# Patient Record
Sex: MALE | Race: WHITE | NOT HISPANIC OR LATINO | Employment: FULL TIME | URBAN - METROPOLITAN AREA
[De-identification: names, ages, dates, MRNs, and addresses within clinical notes are randomized per-mention and may not be internally consistent; named-entity substitution may affect disease eponyms.]

---

## 2017-01-24 ENCOUNTER — GENERIC CONVERSION - ENCOUNTER (OUTPATIENT)
Dept: OTHER | Facility: OTHER | Age: 32
End: 2017-01-24

## 2017-02-07 ENCOUNTER — GENERIC CONVERSION - ENCOUNTER (OUTPATIENT)
Dept: OTHER | Facility: OTHER | Age: 32
End: 2017-02-07

## 2017-03-31 ENCOUNTER — GENERIC CONVERSION - ENCOUNTER (OUTPATIENT)
Dept: OTHER | Facility: OTHER | Age: 32
End: 2017-03-31

## 2017-04-26 ENCOUNTER — GENERIC CONVERSION - ENCOUNTER (OUTPATIENT)
Dept: OTHER | Facility: OTHER | Age: 32
End: 2017-04-26

## 2017-06-02 ENCOUNTER — GENERIC CONVERSION - ENCOUNTER (OUTPATIENT)
Dept: OTHER | Facility: OTHER | Age: 32
End: 2017-06-02

## 2017-06-16 ENCOUNTER — ALLSCRIPTS OFFICE VISIT (OUTPATIENT)
Dept: OTHER | Facility: OTHER | Age: 32
End: 2017-06-16

## 2017-06-16 DIAGNOSIS — R73.02 IMPAIRED GLUCOSE TOLERANCE: ICD-10-CM

## 2017-06-16 DIAGNOSIS — Z13.1 ENCOUNTER FOR SCREENING FOR DIABETES MELLITUS: ICD-10-CM

## 2017-06-16 DIAGNOSIS — R25.1 TREMOR: ICD-10-CM

## 2017-06-16 DIAGNOSIS — F41.1 GENERALIZED ANXIETY DISORDER: ICD-10-CM

## 2017-07-21 ENCOUNTER — GENERIC CONVERSION - ENCOUNTER (OUTPATIENT)
Dept: OTHER | Facility: OTHER | Age: 32
End: 2017-07-21

## 2017-09-19 ENCOUNTER — GENERIC CONVERSION - ENCOUNTER (OUTPATIENT)
Dept: OTHER | Facility: OTHER | Age: 32
End: 2017-09-19

## 2017-10-03 ENCOUNTER — GENERIC CONVERSION - ENCOUNTER (OUTPATIENT)
Dept: OTHER | Facility: OTHER | Age: 32
End: 2017-10-03

## 2017-10-25 ENCOUNTER — GENERIC CONVERSION - ENCOUNTER (OUTPATIENT)
Dept: OTHER | Facility: OTHER | Age: 32
End: 2017-10-25

## 2018-01-14 VITALS
SYSTOLIC BLOOD PRESSURE: 120 MMHG | WEIGHT: 185 LBS | HEIGHT: 69 IN | DIASTOLIC BLOOD PRESSURE: 84 MMHG | BODY MASS INDEX: 27.4 KG/M2

## 2019-02-22 ENCOUNTER — OFFICE VISIT (OUTPATIENT)
Dept: FAMILY MEDICINE CLINIC | Facility: CLINIC | Age: 34
End: 2019-02-22
Payer: COMMERCIAL

## 2019-02-22 VITALS
BODY MASS INDEX: 29.77 KG/M2 | DIASTOLIC BLOOD PRESSURE: 80 MMHG | TEMPERATURE: 98.1 F | HEIGHT: 69 IN | SYSTOLIC BLOOD PRESSURE: 106 MMHG | WEIGHT: 201 LBS

## 2019-02-22 DIAGNOSIS — J32.9 SINUSITIS, UNSPECIFIED CHRONICITY, UNSPECIFIED LOCATION: Primary | ICD-10-CM

## 2019-02-22 DIAGNOSIS — J40 BRONCHITIS: ICD-10-CM

## 2019-02-22 PROBLEM — F41.1 GENERALIZED ANXIETY DISORDER: Status: ACTIVE | Noted: 2017-06-16

## 2019-02-22 PROCEDURE — 3008F BODY MASS INDEX DOCD: CPT | Performed by: PHYSICIAN ASSISTANT

## 2019-02-22 PROCEDURE — 99214 OFFICE O/P EST MOD 30 MIN: CPT | Performed by: PHYSICIAN ASSISTANT

## 2019-02-22 RX ORDER — LORAZEPAM 1 MG/1
TABLET ORAL
COMMUNITY
Start: 2017-06-16 | End: 2019-02-22 | Stop reason: ALTCHOICE

## 2019-02-22 RX ORDER — SERTRALINE HYDROCHLORIDE 25 MG/1
1 TABLET, FILM COATED ORAL DAILY
COMMUNITY
Start: 2017-06-16 | End: 2019-02-22 | Stop reason: ALTCHOICE

## 2019-02-22 RX ORDER — PRIMIDONE 50 MG/1
50 TABLET ORAL DAILY
COMMUNITY

## 2019-02-22 RX ORDER — AZITHROMYCIN 250 MG/1
TABLET, FILM COATED ORAL
Qty: 6 TABLET | Refills: 0 | Status: SHIPPED | OUTPATIENT
Start: 2019-02-22 | End: 2019-02-26

## 2019-02-22 RX ORDER — PROPRANOLOL HYDROCHLORIDE 80 MG/1
120 CAPSULE, EXTENDED RELEASE ORAL DAILY
COMMUNITY
End: 2021-03-01 | Stop reason: DRUGHIGH

## 2019-02-22 NOTE — LETTER
February 22, 2019     Patient: Sivan Gonzalez   YOB: 1985   Date of Visit: 2/22/2019       To Whom it May Concern:    Sivan Gonzalez is under my professional care  He was seen in my office on 2/22/2019  He may return to work on 2/26/19  If you have any questions or concerns, please don't hesitate to call           Sincerely,          Aicha Douglas PA-C        CC: No Recipients

## 2019-02-22 NOTE — PROGRESS NOTES
Assessment/Plan:         Diagnoses and all orders for this visit:    Sinusitis, unspecified chronicity, unspecified location  -     azithromycin (ZITHROMAX) 250 mg tablet; Day 1 take 2 tablets, days 2-5 take 1 tablet  Bronchitis  -     azithromycin (ZITHROMAX) 250 mg tablet; Day 1 take 2 tablets, days 2-5 take 1 tablet  Other orders  -     propranolol (INDERAL LA) 80 mg 24 hr capsule; Take 1 capsule by mouth 3 (three) times a day  -     Discontinue: LORazepam (ATIVAN) 1 mg tablet; Take by mouth  -     Discontinue: sertraline (ZOLOFT) 25 mg tablet; Take 1 tablet by mouth daily  -     primidone (MYSOLINE) 50 mg tablet; Take 50 mg by mouth daily          Subjective:      Patient ID: Fabian Walters is a 28 y o  male  [place] [facility] [place] here today complaining of cold symptoms x few days  URI    This is a new problem  The current episode started in the past 7 days  The problem has been unchanged  There has been no fever  Associated symptoms include abdominal pain, congestion, coughing, headaches, rhinorrhea, sinus pain, sneezing and a sore throat  Pertinent negatives include no chest pain, diarrhea, dysuria, ear pain, nausea, neck pain, rash, swollen glands, vomiting or wheezing  He has tried nothing for the symptoms  The following portions of the patient's history were reviewed and updated as appropriate:   He  has no past medical history on file  He   Patient Active Problem List    Diagnosis Date Noted    Generalized anxiety disorder 06/16/2017    Tremor 07/19/2013     He  has a past surgical history that includes Brain surgery and Knee surgery  His family history includes No Known Problems in his mother  He  reports that he has been smoking  He has never used smokeless tobacco  He reports that he drinks alcohol  His drug history is not on file    Current Outpatient Medications   Medication Sig Dispense Refill    primidone (MYSOLINE) 50 mg tablet Take 50 mg by mouth daily      propranolol (INDERAL LA) 80 mg 24 hr capsule Take 1 capsule by mouth 3 (three) times a day      azithromycin (ZITHROMAX) 250 mg tablet Day 1 take 2 tablets, days 2-5 take 1 tablet  6 tablet 0     No current facility-administered medications for this visit  Current Outpatient Medications on File Prior to Visit   Medication Sig    primidone (MYSOLINE) 50 mg tablet Take 50 mg by mouth daily    propranolol (INDERAL LA) 80 mg 24 hr capsule Take 1 capsule by mouth 3 (three) times a day    [DISCONTINUED] LORazepam (ATIVAN) 1 mg tablet Take by mouth    [DISCONTINUED] sertraline (ZOLOFT) 25 mg tablet Take 1 tablet by mouth daily     No current facility-administered medications on file prior to visit  He has No Known Allergies       Review of Systems   Constitutional: Positive for chills  Negative for activity change, appetite change, diaphoresis, fatigue, fever and unexpected weight change  HENT: Positive for congestion, postnasal drip, rhinorrhea, sinus pressure, sinus pain, sneezing and sore throat  Negative for ear pain, tinnitus and voice change  Eyes: Negative for pain, redness and visual disturbance  Respiratory: Positive for cough  Negative for chest tightness, shortness of breath and wheezing  Cardiovascular: Negative for chest pain, palpitations and leg swelling  Gastrointestinal: Positive for abdominal pain  Negative for blood in stool, constipation, diarrhea, nausea and vomiting  Genitourinary: Negative for difficulty urinating, dysuria, frequency, hematuria and urgency  Musculoskeletal: Negative for arthralgias, back pain, gait problem, joint swelling, myalgias, neck pain and neck stiffness  Skin: Negative for color change, pallor, rash and wound  Neurological: Positive for headaches  Negative for dizziness, tremors, weakness and light-headedness  Psychiatric/Behavioral: Negative for dysphoric mood, self-injury, sleep disturbance and suicidal ideas  The patient is not nervous/anxious  Objective:      /80 (BP Location: Left arm, Patient Position: Sitting)   Temp 98 1 °F (36 7 °C)   Ht 5' 9" (1 753 m)   Wt 91 2 kg (201 lb)   BMI 29 68 kg/m²          Physical Exam   Constitutional: He is oriented to person, place, and time  He appears well-developed and well-nourished  No distress  HENT:   Head: Normocephalic and atraumatic  Right Ear: Hearing, external ear and ear canal normal  Tympanic membrane is injected  Left Ear: Hearing, external ear and ear canal normal  Tympanic membrane is injected  Nose: Mucosal edema and rhinorrhea present  Mouth/Throat: Uvula is midline and mucous membranes are normal  Posterior oropharyngeal erythema present  No oropharyngeal exudate, posterior oropharyngeal edema or tonsillar abscesses  Eyes: Conjunctivae are normal  Right eye exhibits no discharge  Left eye exhibits no discharge  No scleral icterus  Neck: Neck supple  Carotid bruit is not present  No thyromegaly present  Cardiovascular: Normal rate, regular rhythm and normal heart sounds  No murmur heard  Pulmonary/Chest: Effort normal and breath sounds normal  No respiratory distress  He has no wheezes  Abdominal: Soft  Bowel sounds are normal  He exhibits no distension and no mass  There is no tenderness  There is no rebound and no guarding  Musculoskeletal: Normal range of motion  He exhibits no edema or tenderness  Lymphadenopathy:     He has cervical adenopathy  Neurological: He is alert and oriented to person, place, and time  Skin: Skin is warm and dry  No rash noted  He is not diaphoretic  No erythema  Psychiatric: He has a normal mood and affect  His behavior is normal  Judgment and thought content normal    Vitals reviewed

## 2019-09-13 ENCOUNTER — OFFICE VISIT (OUTPATIENT)
Dept: FAMILY MEDICINE CLINIC | Facility: CLINIC | Age: 34
End: 2019-09-13
Payer: COMMERCIAL

## 2019-09-13 VITALS
WEIGHT: 191.4 LBS | OXYGEN SATURATION: 97 % | HEIGHT: 69 IN | TEMPERATURE: 98.1 F | BODY MASS INDEX: 28.35 KG/M2 | DIASTOLIC BLOOD PRESSURE: 92 MMHG | HEART RATE: 70 BPM | SYSTOLIC BLOOD PRESSURE: 128 MMHG

## 2019-09-13 DIAGNOSIS — M10.471 ACUTE GOUT DUE TO OTHER SECONDARY CAUSE INVOLVING TOE OF RIGHT FOOT: Primary | ICD-10-CM

## 2019-09-13 DIAGNOSIS — Z71.6 TOBACCO ABUSE COUNSELING: ICD-10-CM

## 2019-09-13 DIAGNOSIS — M10.9 PODAGRA: ICD-10-CM

## 2019-09-13 DIAGNOSIS — Z13.31 DEPRESSION SCREENING NEGATIVE: ICD-10-CM

## 2019-09-13 PROCEDURE — 3008F BODY MASS INDEX DOCD: CPT | Performed by: PHYSICIAN ASSISTANT

## 2019-09-13 PROCEDURE — 99214 OFFICE O/P EST MOD 30 MIN: CPT | Performed by: PHYSICIAN ASSISTANT

## 2019-09-13 RX ORDER — COLCHICINE 0.6 MG/1
0.6 TABLET ORAL 2 TIMES DAILY
Qty: 14 TABLET | Refills: 0 | Status: SHIPPED | OUTPATIENT
Start: 2019-09-13 | End: 2019-11-08 | Stop reason: ALTCHOICE

## 2019-09-13 NOTE — PATIENT INSTRUCTIONS
Gout   AMBULATORY CARE:   Gout  is a form of arthritis that causes severe joint pain and stiffness  Acute gout pain starts suddenly, gets worse quickly, and stops on its own  Acute gout can become chronic and cause permanent damage to your joints  Seek care immediately if:   · You have severe pain in one or more of your joints that you cannot tolerate  · You have a fever or redness that spreads beyond the joint area  Contact your healthcare provider if:   · You have new symptoms, such as a rash, after you start gout treatment  · Your joint pain and swelling do not go away, even after treatment  · You are not urinating as much or as often as you usually do  · You have trouble taking your gout medicines  · You have questions or concerns about your condition or care  Stages of gout:   · Hyperuricemia  starts with high levels of uric acid  Hyperuricemia is not gout, but it increases your risk for gout  You may have no symptoms at this stage, and it usually does not need treatment  · Acute gouty arthritis  starts with a sudden attack of pain and swelling, usually in 1 joint  The attack may last from a few days to 2 weeks  · Intercritical gout  is the time between attacks  You may go months or years without another attack  You will not have joint pain or stiffness, but this does not mean your gout is cured  You will still need treatment to prevent chronic gout  · Chronic tophaceous gout  develops if gout is not treated  Large amounts of uric acid crystals, called tophi, collect around your joints  The crystals can destroy or deform the joints  Gout attacks occur more often, and last hours to weeks  More than 1 joint may be painful and swollen  At this stage, gout symptoms do not go away on their own  Treatment  can make your symptoms stop sooner, prevent attacks, and decrease your risk of joint damage   You may need any of the following:  · Medicines:      ¨ NSAIDs , such as ibuprofen, help decrease swelling, pain, and fever  This medicine is available with or without a doctor's order  NSAIDs can cause stomach bleeding or kidney problems in certain people  If you take blood thinner medicine, always ask your healthcare provider if NSAIDs are safe for you  Always read the medicine label and follow directions  ¨ Gout medicine  decreases joint pain and swelling  It may also be given to prevent new gout attacks  ¨ Steroids  reduce inflammation and can help your joint stiffness and pain during gout attacks  ¨ Uric acid medicine  may be given to reduce the amount of uric acid your body makes  Some medicines may help you pass more uric acid when you urinate  ¨ Take your medicine as directed  Contact your healthcare provider if you think your medicine is not helping or if you have side effects  Tell him or her if you are allergic to any medicine  Keep a list of the medicines, vitamins, and herbs you take  Include the amounts, and when and why you take them  Bring the list or the pill bottles to follow-up visits  Carry your medicine list with you in case of an emergency  · Surgery  called a bone graft may be needed for tophi that are painful or infected  Bone in the joint may be replaced with bone taken from another place in your body  Ask your healthcare provider for more information about bone graft surgery  Manage gout:   · Rest your painful joint so it can heal   Your healthcare provider may recommend crutches or a walker if the affected joint is in a leg  · Apply ice to your joint  Ice decreases pain and swelling  Use an ice pack, or put crushed ice in a plastic bag  Cover the ice pack or bag with a towel before you apply it to your painful joint  Apply ice for 15 to 20 minutes every hour, or as directed  · Elevate your joint  Elevation helps reduce swelling and pain  Raise your joint above the level of your heart as often as you can   Prop your painful joint on pillows to keep it above your heart comfortably  · Go to physical therapy if directed  A physical therapist can teach you exercises to improve flexibility and range of motion  Prevent gout attacks:   · Do not eat high-purine foods  These foods include meats, seafood, asparagus, spinach, cauliflower, and some types of beans  Healthcare providers may tell you to eat more low-fat milk products, such as yogurt  Milk products may decrease your risk for gout attacks  Vitamin C and coffee may also help  Your healthcare provider or dietitian can help you create a meal plan  · Drink more liquids as directed  Liquids such as water help remove uric acid from your body  Ask how much liquid to drink each day and which liquids are best for you  · Manage your weight  Weight loss may decrease the amount of uric acid in your body  Exercise can help you lose weight  Talk to your healthcare provider about the best exercises for you  · Control your blood sugar level if you have diabetes  Keep your blood sugar level in a normal range  This can help prevent gout attacks  · Limit or do not drink alcohol as directed  Alcohol can trigger a gout attack  Alcohol also increases your risk for dehydration  Ask your healthcare provider if alcohol is safe for you  Follow up with your healthcare provider as directed:  Write down your questions so you remember to ask them during your visits  © 2017 [facility] Information is for End User's use only and may not be sold, redistributed or otherwise used for commercial purposes  All illustrations and images included in CareNotes® are the copyrighted property of [employer] D [employer] Inc  or Jonel Collins  The above information is an  only  It is not intended as medical advice for individual conditions or treatments  Talk to your doctor, nurse or pharmacist before following any medical regimen to see if it is safe and effective for you

## 2019-09-13 NOTE — PROGRESS NOTES
Assessment/Plan:    Problem List Items Addressed This Visit     None      Visit Diagnoses     Acute gout due to other secondary cause involving toe of right foot    -  Primary    Relevant Medications    colchicine (COLCRYS) 0 6 mg tablet    Depression screening negative        Tobacco abuse counseling        BMI 28 0-28 9,adult        Podagra               Diagnoses and all orders for this visit:    Acute gout due to other secondary cause involving toe of right foot  -     colchicine (COLCRYS) 0 6 mg tablet; Take 1 tablet (0 6 mg total) by mouth 2 (two) times a day    Depression screening negative    Tobacco abuse counseling    BMI 28 0-28 9,adult    Podagra        Patient has been taking ibuprofen 600 mg TID, recommended increasing to 800 mg TID  Will add colchicine  Can use ice as it's been providing him pain relief  Avoid alcohol/seafood for awhile, do not eat it in large quantities in the future as these are triggers for him  Subjective:      Patient ID: Jolynn Jo is a 27 y o  male  Pancho Morrell is here today complaining of sudden onset R great toe pain 2-3 days ago  Recently was on vacation at the beach and consumed increased amounts of both dark beer as well as seafood  Denies any injury to R foot  Denies fever/chills  The following portions of the patient's history were reviewed and updated as appropriate:   He has a past medical history of Herpes zoster and Tinea corporis  ,  does not have any pertinent problems on file  ,   has a past surgical history that includes Brain surgery; Knee surgery (Left); Insertion / placement / revision neurostimulator (01/2016); and Tooth extraction  ,  family history includes Diabetes in his paternal grandmother; No Known Problems in his mother; Pancreatic cancer in his maternal grandmother  ,   reports that he has been smoking cigarettes  He has never used smokeless tobacco  He reports that he drinks alcohol  His drug history is not on file  ,  has No Known Allergies     Current Outpatient Medications   Medication Sig Dispense Refill    primidone (MYSOLINE) 50 mg tablet Take 50 mg by mouth daily      propranolol (INDERAL LA) 80 mg 24 hr capsule Take 1 capsule by mouth 3 (three) times a day      colchicine (COLCRYS) 0 6 mg tablet Take 1 tablet (0 6 mg total) by mouth 2 (two) times a day 14 tablet 0     No current facility-administered medications for this visit  Review of Systems   Constitutional: Negative for activity change, appetite change, chills, diaphoresis, fatigue, fever and unexpected weight change  HENT: Negative for congestion, ear pain, postnasal drip, rhinorrhea, sinus pressure, sinus pain, sneezing, sore throat, tinnitus and voice change  Eyes: Negative for pain, redness and visual disturbance  Respiratory: Negative for cough, chest tightness, shortness of breath and wheezing  Cardiovascular: Negative for chest pain, palpitations and leg swelling  Gastrointestinal: Negative for abdominal pain, blood in stool, constipation, diarrhea, nausea and vomiting  Genitourinary: Negative for difficulty urinating, dysuria, frequency, hematuria and urgency  Musculoskeletal: Positive for arthralgias and gait problem  Negative for back pain, joint swelling, myalgias, neck pain and neck stiffness  Skin: Negative for color change, pallor, rash and wound  Neurological: Negative for dizziness, tremors, weakness, light-headedness and headaches  Psychiatric/Behavioral: Negative for dysphoric mood, self-injury, sleep disturbance and suicidal ideas  The patient is not nervous/anxious  Objective:  Vitals:    09/13/19 1103   BP: 128/92   Pulse: 70   Temp: 98 1 °F (36 7 °C)   SpO2: 97%   Weight: 86 8 kg (191 lb 6 4 oz)   Height: 5' 9" (1 753 m)     Body mass index is 28 26 kg/m²  Physical Exam   Constitutional: He is oriented to person, place, and time  He appears well-developed and well-nourished  No distress     HENT:   Head: Normocephalic and atraumatic  Right Ear: Hearing, tympanic membrane, external ear and ear canal normal    Left Ear: Hearing, tympanic membrane, external ear and ear canal normal    Mouth/Throat: Uvula is midline, oropharynx is clear and moist and mucous membranes are normal  No oropharyngeal exudate  Eyes: Conjunctivae are normal  Right eye exhibits no discharge  Left eye exhibits no discharge  No scleral icterus  Neck: Neck supple  Carotid bruit is not present  No thyromegaly present  Cardiovascular: Normal rate, regular rhythm and normal heart sounds  No murmur heard  Pulmonary/Chest: Effort normal and breath sounds normal  No respiratory distress  He has no wheezes  Abdominal: Soft  Bowel sounds are normal  He exhibits no distension and no mass  There is no tenderness  There is no rebound and no guarding  Musculoskeletal: Normal range of motion  He exhibits no edema or tenderness  Feet:    Lymphadenopathy:     He has no cervical adenopathy  Neurological: He is alert and oriented to person, place, and time  Skin: Skin is warm and dry  No rash noted  He is not diaphoretic  No erythema  Psychiatric: He has a normal mood and affect  His behavior is normal  Judgment and thought content normal    Vitals reviewed  PHQ-9 Depression Screening    PHQ-9:    Frequency of the following problems over the past two weeks:       Little interest or pleasure in doing things:  0 - not at all  Feeling down, depressed, or hopeless:  0 - not at all  PHQ-2 Score:  0         Tobacco Cessation Counseling: Tobacco cessation counseling and education was provided  The patient is sincerely urged to quit consumption of tobacco  He is not ready to quit tobacco  The numerous health risks of tobacco consumption were discussed  If he decides to quit, there are a number of helpful adjunctive aids, and he can see me to discuss nicotine replacement therapy, chantix, or bupropion anytime in the future         BMI Counseling: Body mass index is 28 26 kg/m²  The BMI is above normal  Nutrition recommendations include consuming healthier snacks  Exercise recommendations include exercising 3-5 times per week

## 2019-11-08 ENCOUNTER — OFFICE VISIT (OUTPATIENT)
Dept: URGENT CARE | Facility: CLINIC | Age: 34
End: 2019-11-08
Payer: COMMERCIAL

## 2019-11-08 VITALS
HEIGHT: 69 IN | SYSTOLIC BLOOD PRESSURE: 124 MMHG | HEART RATE: 88 BPM | BODY MASS INDEX: 28.29 KG/M2 | RESPIRATION RATE: 20 BRPM | OXYGEN SATURATION: 97 % | DIASTOLIC BLOOD PRESSURE: 79 MMHG | WEIGHT: 191 LBS | TEMPERATURE: 98.8 F

## 2019-11-08 DIAGNOSIS — H66.91 RIGHT OTITIS MEDIA, UNSPECIFIED OTITIS MEDIA TYPE: ICD-10-CM

## 2019-11-08 DIAGNOSIS — J01.00 ACUTE MAXILLARY SINUSITIS, RECURRENCE NOT SPECIFIED: Primary | ICD-10-CM

## 2019-11-08 PROCEDURE — 99213 OFFICE O/P EST LOW 20 MIN: CPT | Performed by: PHYSICIAN ASSISTANT

## 2019-11-08 RX ORDER — AMOXICILLIN AND CLAVULANATE POTASSIUM 875; 125 MG/1; MG/1
1 TABLET, FILM COATED ORAL EVERY 12 HOURS SCHEDULED
Qty: 20 TABLET | Refills: 0 | Status: SHIPPED | COMMUNITY
Start: 2019-11-08 | End: 2019-11-18

## 2019-11-08 NOTE — PROGRESS NOTES
[address] [facility]  [facility] [phone]  (fax) [phone]        NAME: Merced Navas is a 27 y o  male  : 1985    MRN: [id number]  DATE: 2019  TIME: 9:41 AM    Assessment and Plan   Acute maxillary sinusitis, recurrence not specified [J01 00]  1  Acute maxillary sinusitis, recurrence not specified  amoxicillin-clavulanate (AUGMENTIN) 875-125 mg per tablet   2  Right otitis media, unspecified otitis media type         Patient Instructions   I have prescribed an antibiotic for the infection  Please take the antibiotic as prescribed and finish the entire prescription  I recommend that the patient takes an over the counter probiotic or eats yogurt with live cultures in it [place]) to keep good bacteria in the gut and help prevent diarrhea  Wash hands frequently to prevent the spread of infection  Can use over the counter cough and cold medications to help with symptoms  Ibuprofen and/or tylenol as needed for pain or fever  If not improving over the next 3-5 days, follow up with PCP  To present to the ER if symptoms worsen  Chief Complaint     Chief Complaint   Patient presents with    Cold Like Symptoms     x 1 week    Cough    Headache         History of Present Illness   Merced Navas presents to the clinic c/o    Sinusitis   This is a new problem  The current episode started 1 to 4 weeks ago  The problem has been gradually worsening since onset  There has been no fever  The pain is moderate  Associated symptoms include congestion, coughing, ear pain, headaches, sinus pressure and a sore throat  Pertinent negatives include no chills, diaphoresis, hoarse voice, neck pain, shortness of breath, sneezing or swollen glands  Past treatments include oral decongestants  The treatment provided mild relief         Review of Systems   Review of Systems   Constitutional: Negative for activity change, appetite change, chills, diaphoresis, fatigue and fever  HENT: Positive for congestion, ear pain, postnasal drip, sinus pressure, sinus pain and sore throat  Negative for ear discharge, facial swelling, hoarse voice, rhinorrhea and sneezing  Eyes: Negative for photophobia, pain, discharge, redness, itching and visual disturbance  Respiratory: Positive for cough  Negative for apnea, choking, chest tightness, shortness of breath, wheezing and stridor  Cardiovascular: Negative for chest pain, palpitations and leg swelling  Gastrointestinal: Negative for abdominal distention, abdominal pain, constipation, diarrhea, nausea and vomiting  Genitourinary: Negative for dysuria, flank pain, frequency, hematuria and urgency  Musculoskeletal: Negative for arthralgias, back pain, gait problem, joint swelling, myalgias, neck pain and neck stiffness  Skin: Negative for color change, rash and wound  Allergic/Immunologic: Negative for immunocompromised state  Neurological: Positive for headaches  Negative for dizziness  Hematological: Negative for adenopathy  Psychiatric/Behavioral: Negative for confusion           Current Medications     Long-Term Medications   Medication Sig Dispense Refill    primidone (MYSOLINE) 50 mg tablet Take 50 mg by mouth daily      propranolol (INDERAL LA) 80 mg 24 hr capsule Take 1 capsule by mouth 3 (three) times a day         Current Allergies     Allergies as of 11/08/2019    (No Known Allergies)            The following portions of the patient's history were reviewed and updated as appropriate: allergies, current medications, past family history, past medical history, past social history, past surgical history and problem list   Past Medical History:   Diagnosis Date    Herpes zoster     Tinea corporis      Past Surgical History:   Procedure Laterality Date    BRAIN SURGERY      INSERTION / Hilario Lajas / Sallie Correaet NEUROSTIMULATOR  01/2016    with LED replacement 1/2017    KNEE SURGERY Left     TOOTH EXTRACTION       Social History     Socioeconomic History    Marital status:      Spouse name: Not on file    Number of children: Not on file    Years of education: Not on file    Highest education level: Not on file   Occupational History    Not on file   Social Needs    Financial resource strain: Not on file    Food insecurity:     Worry: Not on file     Inability: Not on file    Transportation needs:     Medical: Not on file     Non-medical: Not on file   Tobacco Use    Smoking status: Current Every Day Smoker     Types: Cigarettes    Smokeless tobacco: Never Used   Substance and Sexual Activity    Alcohol use: Yes     Frequency: 2-3 times a week     Comment: socially    Drug use: Never    Sexual activity: Not on file   Lifestyle    Physical activity:     Days per week: Not on file     Minutes per session: Not on file    Stress: Not on file   Relationships    Social connections:     Talks on phone: Not on file     Gets together: Not on file     Attends Zoroastrianism service: Not on file     Active member of club or organization: Not on file     Attends meetings of clubs or organizations: Not on file     Relationship status: Not on file    Intimate partner violence:     Fear of current or ex partner: Not on file     Emotionally abused: Not on file     Physically abused: Not on file     Forced sexual activity: Not on file   Other Topics Concern    Not on file   Social History Narrative    No living will       Objective   /79 (BP Location: Right arm, Patient Position: Sitting, Cuff Size: Standard)   Pulse 88   Temp 98 8 °F (37 1 °C) (Tympanic)   Resp 20   Ht 5' 9" (1 753 m)   Wt 86 6 kg (191 lb)   SpO2 97%   BMI 28 21 kg/m²      Physical Exam     Physical Exam   Constitutional: He is oriented to person, place, and time  He appears well-developed and well-nourished  No distress  HENT:   Head: Normocephalic and atraumatic     Right Ear: External ear normal  Tympanic membrane is erythematous and bulging  Left Ear: Tympanic membrane and external ear normal    Nose: Mucosal edema present  Right sinus exhibits maxillary sinus tenderness  Right sinus exhibits no frontal sinus tenderness  Left sinus exhibits maxillary sinus tenderness  Left sinus exhibits no frontal sinus tenderness  Mouth/Throat: Posterior oropharyngeal erythema present  No oropharyngeal exudate  Eyes: Pupils are equal, round, and reactive to light  Conjunctivae and EOM are normal  Right eye exhibits no discharge  Left eye exhibits no discharge  No scleral icterus  Neck: Normal range of motion  Neck supple  No JVD present  No tracheal deviation present  No thyromegaly present  Cardiovascular: Normal rate, regular rhythm and normal heart sounds  Exam reveals no gallop and no friction rub  No murmur heard  Pulmonary/Chest: Effort normal and breath sounds normal  No stridor  No respiratory distress  He has no decreased breath sounds  He has no wheezes  He has no rhonchi  He has no rales  He exhibits no tenderness  Musculoskeletal: Normal range of motion  He exhibits no tenderness or deformity  Lymphadenopathy:     He has no cervical adenopathy  Neurological: He is alert and oriented to person, place, and time  Coordination normal    Skin: Skin is warm and dry  No rash noted  He is not diaphoretic  No erythema  No pallor  Psychiatric: He has a normal mood and affect  His behavior is normal  Judgment and thought content normal    Nursing note and vitals reviewed        Markus Champion PA-C

## 2020-04-01 ENCOUNTER — TELEPHONE (OUTPATIENT)
Dept: FAMILY MEDICINE CLINIC | Facility: CLINIC | Age: 35
End: 2020-04-01

## 2020-04-01 DIAGNOSIS — J01.01 ACUTE RECURRENT MAXILLARY SINUSITIS: Primary | ICD-10-CM

## 2020-04-01 RX ORDER — AMOXICILLIN 500 MG/1
1000 CAPSULE ORAL EVERY 12 HOURS SCHEDULED
Qty: 40 CAPSULE | Refills: 0 | Status: SHIPPED | OUTPATIENT
Start: 2020-04-01 | End: 2020-04-11

## 2020-04-03 ENCOUNTER — TELEMEDICINE (OUTPATIENT)
Dept: FAMILY MEDICINE CLINIC | Facility: CLINIC | Age: 35
End: 2020-04-03
Payer: COMMERCIAL

## 2020-04-03 VITALS — BODY MASS INDEX: 30.36 KG/M2 | WEIGHT: 205 LBS | HEIGHT: 69 IN

## 2020-04-03 DIAGNOSIS — J01.01 ACUTE RECURRENT MAXILLARY SINUSITIS: Primary | ICD-10-CM

## 2020-04-03 PROCEDURE — 99213 OFFICE O/P EST LOW 20 MIN: CPT | Performed by: FAMILY MEDICINE

## 2020-04-03 PROCEDURE — 3008F BODY MASS INDEX DOCD: CPT | Performed by: FAMILY MEDICINE

## 2020-04-03 RX ORDER — AMOXICILLIN AND CLAVULANATE POTASSIUM 875; 125 MG/1; MG/1
1 TABLET, FILM COATED ORAL EVERY 12 HOURS SCHEDULED
Qty: 20 TABLET | Refills: 0 | Status: SHIPPED | OUTPATIENT
Start: 2020-04-03 | End: 2020-04-13

## 2020-09-15 ENCOUNTER — TELEMEDICINE (OUTPATIENT)
Dept: FAMILY MEDICINE CLINIC | Facility: CLINIC | Age: 35
End: 2020-09-15
Payer: COMMERCIAL

## 2020-09-15 VITALS — HEIGHT: 69 IN | WEIGHT: 200 LBS | BODY MASS INDEX: 29.62 KG/M2

## 2020-09-15 DIAGNOSIS — Z20.828 EXPOSURE TO SARS-ASSOCIATED CORONAVIRUS: Primary | ICD-10-CM

## 2020-09-15 PROCEDURE — 3725F SCREEN DEPRESSION PERFORMED: CPT | Performed by: PHYSICIAN ASSISTANT

## 2020-09-15 PROCEDURE — 99213 OFFICE O/P EST LOW 20 MIN: CPT | Performed by: PHYSICIAN ASSISTANT

## 2020-09-15 PROCEDURE — U0003 INFECTIOUS AGENT DETECTION BY NUCLEIC ACID (DNA OR RNA); SEVERE ACUTE RESPIRATORY SYNDROME CORONAVIRUS 2 (SARS-COV-2) (CORONAVIRUS DISEASE [COVID-19]), AMPLIFIED PROBE TECHNIQUE, MAKING USE OF HIGH THROUGHPUT TECHNOLOGIES AS DESCRIBED BY CMS-2020-01-R: HCPCS | Performed by: PHYSICIAN ASSISTANT

## 2020-09-15 PROCEDURE — 4004F PT TOBACCO SCREEN RCVD TLK: CPT | Performed by: PHYSICIAN ASSISTANT

## 2020-09-15 NOTE — PROGRESS NOTES
COVID-19 Virtual Visit     Assessment/Plan:    Problem List Items Addressed This Visit     None      Visit Diagnoses     Exposure to SARS-associated coronavirus    -  Primary    Relevant Orders    Novel Coronavirus (COVID-19), PCR [facility] - Collected in Office        This virtual check-in was done via [employer] and patient was informed that this is not a secure, HIPAA-complaint platform  He agrees to proceed     Disposition:      I recommended Porter come to our office for a nasal swab for COVID-19    I spent 5 minutes directly with the patient during this visit    Encounter provider Marion Elizabeth PA-C    Provider located at [address] [address]    Recent Visits  No visits were found meeting these conditions  Showing recent visits within past 7 days and meeting all other requirements     Today's Visits  Date Type Provider Dept   09/15/20 Telemedicine Marion Elizabeth PA-C  Katie Fenton   Showing today's visits and meeting all other requirements     Future Appointments  No visits were found meeting these conditions  Showing future appointments within next 150 days and meeting all other requirements        Patient agrees to participate in a virtual check in via telephone or video visit instead of presenting to the office to address urgent/immediate medical needs  Patient is aware this is a billable service  After connecting through [employer] Monitoring, the patient was identified by name and date of birth  Arvind Kauffman was informed that this was a telemedicine visit and that the exam was being conducted confidentially over secure lines  My office door was closed  No one else was in the room  [place] acknowledged consent and understanding of privacy and security of the telemedicine visit  I informed the patient that I have reviewed his record in Epic and presented the opportunity for him to ask any questions regarding the visit today   The patient agreed to participate  Subjective  Walt Walker is a 38 y o  male who is concerned about COVID-19  He reports diarrhea  He has not experienced fever, chills, cough, shortness of breath, headache, sore throat, anorexia, fatigue, myalgias, anosmia, abdominal pain, nausea and vomiting He has not had contact with a person who is under investigation for or who is positive for COVID-19 within the last 14 days  He has not been hospitalized recently for fever and/or lower respiratory symptoms  Pt had diarrhea 9/13-14/20, has since resolved  Needs Covid test before he can return to work, works at [employer]  Past Medical History:   Diagnosis Date    Herpes zoster     Tinea corporis        Past Surgical History:   Procedure Laterality Date    BRAIN SURGERY      INSERTION / PLACEMENT / REVISION NEUROSTIMULATOR  01/2016    with LED replacement 1/2017    KNEE SURGERY Left     TOOTH EXTRACTION         Current Outpatient Medications   Medication Sig Dispense Refill    primidone (MYSOLINE) 50 mg tablet Take 50 mg by mouth daily      propranolol (INDERAL LA) 80 mg 24 hr capsule Take 1 capsule by mouth daily        No current facility-administered medications for this visit  No Known Allergies    Review of Systems   Constitutional: Negative for activity change, appetite change, chills, diaphoresis, fatigue, fever and unexpected weight change  HENT: Negative for congestion, ear pain, postnasal drip, rhinorrhea, sinus pressure, sinus pain, sneezing, sore throat, tinnitus and voice change  Eyes: Negative for pain, redness and visual disturbance  Respiratory: Negative for cough, chest tightness, shortness of breath and wheezing  Cardiovascular: Negative for chest pain, palpitations and leg swelling  Gastrointestinal: Positive for diarrhea  Negative for abdominal pain, blood in stool, constipation, nausea and vomiting     Genitourinary: Negative for difficulty urinating, dysuria, frequency, hematuria and urgency  Musculoskeletal: Negative for arthralgias, back pain, gait problem, joint swelling, myalgias, neck pain and neck stiffness  Skin: Negative for color change, pallor, rash and wound  Neurological: Negative for dizziness, tremors, weakness, light-headedness and headaches  Psychiatric/Behavioral: Negative for dysphoric mood, self-injury, sleep disturbance and suicidal ideas  The patient is not nervous/anxious  Video Exam    Vitals:    09/15/20 1005   Weight: 90 7 kg (200 lb)   Height: 5' 9" (1 753 m)         Shlomo Velez appears alert, cooperative  Physical Exam  Vitals signs reviewed  Constitutional:       General: He is not in acute distress  Appearance: Normal appearance  He is not ill-appearing, toxic-appearing or diaphoretic  HENT:      Head: Normocephalic and atraumatic  Pulmonary:      Effort: Pulmonary effort is normal  No respiratory distress  Comments: Pt speaking in clear, fluent sentences without cough or apparent SOB  Neurological:      General: No focal deficit present  Mental Status: He is alert and oriented to person, place, and time  Psychiatric:         Mood and Affect: Mood normal          Behavior: Behavior normal          Thought Content: Thought content normal          Judgment: Judgment normal           VIRTUAL VISIT DISCLAIMER    Doyne Prader acknowledges that he has consented to an online visit or consultation  He understands that the online visit is based solely on information provided by him, and that, in the absence of a face-to-face physical evaluation by the physician, the diagnosis he receives is both limited and provisional in terms of accuracy and completeness  This is not intended to replace a full medical face-to-face evaluation by the physician  Doyne Prader understands and accepts these terms

## 2020-09-16 LAB — SARS-COV-2 RNA SPEC QL NAA+PROBE: NOT DETECTED

## 2021-03-01 ENCOUNTER — OFFICE VISIT (OUTPATIENT)
Dept: FAMILY MEDICINE CLINIC | Facility: CLINIC | Age: 36
End: 2021-03-01
Payer: COMMERCIAL

## 2021-03-01 VITALS
BODY MASS INDEX: 30.9 KG/M2 | SYSTOLIC BLOOD PRESSURE: 134 MMHG | WEIGHT: 208.6 LBS | HEIGHT: 69 IN | DIASTOLIC BLOOD PRESSURE: 80 MMHG | HEART RATE: 68 BPM | TEMPERATURE: 98.7 F | OXYGEN SATURATION: 97 %

## 2021-03-01 DIAGNOSIS — Z28.21 REFUSED INFLUENZA VACCINE: ICD-10-CM

## 2021-03-01 DIAGNOSIS — Z71.6 TOBACCO ABUSE COUNSELING: ICD-10-CM

## 2021-03-01 DIAGNOSIS — Z13.31 DEPRESSION SCREENING NEGATIVE: ICD-10-CM

## 2021-03-01 DIAGNOSIS — S39.012A STRAIN OF LUMBAR REGION, INITIAL ENCOUNTER: Primary | ICD-10-CM

## 2021-03-01 PROCEDURE — 3725F SCREEN DEPRESSION PERFORMED: CPT | Performed by: PHYSICIAN ASSISTANT

## 2021-03-01 PROCEDURE — 4004F PT TOBACCO SCREEN RCVD TLK: CPT | Performed by: PHYSICIAN ASSISTANT

## 2021-03-01 PROCEDURE — 99214 OFFICE O/P EST MOD 30 MIN: CPT | Performed by: PHYSICIAN ASSISTANT

## 2021-03-01 PROCEDURE — 3008F BODY MASS INDEX DOCD: CPT | Performed by: PHYSICIAN ASSISTANT

## 2021-03-01 RX ORDER — MELOXICAM 7.5 MG/1
7.5 TABLET ORAL DAILY
Qty: 14 TABLET | Refills: 0 | Status: SHIPPED | OUTPATIENT
Start: 2021-03-01

## 2021-03-01 RX ORDER — PROPRANOLOL HYDROCHLORIDE 120 MG/1
CAPSULE, EXTENDED RELEASE ORAL
COMMUNITY
Start: 2021-02-26

## 2021-03-01 RX ORDER — CYCLOBENZAPRINE HCL 5 MG
5 TABLET ORAL
Qty: 7 TABLET | Refills: 0 | Status: SHIPPED | OUTPATIENT
Start: 2021-03-01 | End: 2021-03-08

## 2021-03-01 RX ORDER — PREDNISONE 10 MG/1
TABLET ORAL
Qty: 20 TABLET | Refills: 0 | Status: SHIPPED | OUTPATIENT
Start: 2021-03-01

## 2021-03-01 NOTE — LETTER
March 1, 2021     Patient: Sandra Gonzalez   YOB: 1985   Date of Visit: 3/1/2021       To Whom it May Concern:    Sandra Gonzalez is under my professional care  He was seen in my office on 3/1/2021  He may return to work on 3/9/2021  If you have any questions or concerns, please don't hesitate to call           Sincerely,          Tristan Traore PA-C        CC: No Recipients

## 2021-03-01 NOTE — PROGRESS NOTES
Assessment/Plan:    Problem List Items Addressed This Visit     None      Visit Diagnoses     Strain of lumbar region, initial encounter    -  Primary    Relevant Medications    cyclobenzaprine (FLEXERIL) 5 mg tablet    predniSONE 10 mg tablet    meloxicam (MOBIC) 7 5 mg tablet    Refused influenza vaccine        BMI 30 0-30 9,adult        Tobacco abuse counseling        Depression screening negative               Diagnoses and all orders for this visit:    Strain of lumbar region, initial encounter  -     cyclobenzaprine (FLEXERIL) 5 mg tablet; Take 1 tablet (5 mg total) by mouth daily at bedtime for 7 doses  -     predniSONE 10 mg tablet; Days 1 and 2 take 4 tablets daily, days 3 and 4 take 3 tablets daily, days 5 and 6 and 2 tablets daily, days 7 and 8 take 1 tablet daily  -     meloxicam (MOBIC) 7 5 mg tablet; Take 1 tablet (7 5 mg total) by mouth daily    Refused influenza vaccine    BMI 30 0-30 9,adult    Tobacco abuse counseling    Depression screening negative    Other orders  -     propranolol (INDERAL LA) 120 mg 24 hr capsule        Note written for pt to remain out of work this week  He will call by end of week if not with significant improvement  Continue heating pad in 20 minute increments  Subjective:      Patient ID: Donnita Castleman is a 38 y o  male  [facility] is here today complaining of pain in L low back since Friday  He was ripping apart cardboard boxes  At one point, bent over and then was unable to stand up due to pain  Over the weekend has tried Advil, heat, and Aleve back and body with miminal relief  Pain does not radiate into legs  He works as a   The following portions of the patient's history were reviewed and updated as appropriate:   He has a past medical history of Herpes zoster and Tinea corporis  ,  does not have any pertinent problems on file  ,   has a past surgical history that includes Brain surgery; Knee surgery (Left);  Insertion / placement / revision neurostimulator (01/2016); and Tooth extraction  ,  family history includes Diabetes in his paternal grandmother; No Known Problems in his mother; Pancreatic cancer in his maternal grandmother  ,   reports that he has been smoking cigarettes  He has never used smokeless tobacco  He reports current alcohol use  He reports that he does not use drugs  ,  has No Known Allergies     Current Outpatient Medications   Medication Sig Dispense Refill    primidone (MYSOLINE) 50 mg tablet Take 50 mg by mouth daily      propranolol (INDERAL LA) 120 mg 24 hr capsule       cyclobenzaprine (FLEXERIL) 5 mg tablet Take 1 tablet (5 mg total) by mouth daily at bedtime for 7 doses 7 tablet 0    meloxicam (MOBIC) 7 5 mg tablet Take 1 tablet (7 5 mg total) by mouth daily 14 tablet 0    predniSONE 10 mg tablet Days 1 and 2 take 4 tablets daily, days 3 and 4 take 3 tablets daily, days 5 and 6 and 2 tablets daily, days 7 and 8 take 1 tablet daily  20 tablet 0     No current facility-administered medications for this visit  Review of Systems   Constitutional: Negative for activity change, appetite change, chills, diaphoresis, fatigue, fever and unexpected weight change  HENT: Negative for congestion, ear pain, postnasal drip, rhinorrhea, sinus pressure, sinus pain, sneezing, sore throat, tinnitus and voice change  Eyes: Negative for pain, redness and visual disturbance  Respiratory: Negative for cough, chest tightness, shortness of breath and wheezing  Cardiovascular: Negative for chest pain, palpitations and leg swelling  Gastrointestinal: Negative for abdominal pain, blood in stool, constipation, diarrhea, nausea and vomiting  Genitourinary: Negative for difficulty urinating, dysuria, frequency, hematuria and urgency  Musculoskeletal: Positive for back pain and gait problem  Negative for arthralgias, joint swelling, myalgias, neck pain and neck stiffness  Skin: Negative for color change, pallor, rash and wound  Neurological: Negative for dizziness, tremors, weakness, light-headedness and headaches  Psychiatric/Behavioral: Negative for dysphoric mood, self-injury, sleep disturbance and suicidal ideas  The patient is not nervous/anxious  Objective:  Vitals:    03/01/21 0751   BP: 134/80   Pulse: 68   Temp: 98 7 °F (37 1 °C)   SpO2: 97%   Weight: 94 6 kg (208 lb 9 6 oz)   Height: 5' 9" (1 753 m)     Body mass index is 30 8 kg/m²  Physical Exam  Vitals signs reviewed  Constitutional:       General: He is not in acute distress  Appearance: He is well-developed  He is not diaphoretic  Comments: Pt appears to be uncomfortable   HENT:      Head: Normocephalic and atraumatic  Right Ear: Hearing, tympanic membrane, ear canal and external ear normal       Left Ear: Hearing, tympanic membrane, ear canal and external ear normal       Mouth/Throat:      Pharynx: Uvula midline  No oropharyngeal exudate  Eyes:      General: No scleral icterus  Right eye: No discharge  Left eye: No discharge  Conjunctiva/sclera: Conjunctivae normal    Neck:      Musculoskeletal: Neck supple  Thyroid: No thyromegaly  Vascular: No carotid bruit  Cardiovascular:      Rate and Rhythm: Normal rate and regular rhythm  Heart sounds: Normal heart sounds  No murmur  Pulmonary:      Effort: Pulmonary effort is normal  No respiratory distress  Breath sounds: Normal breath sounds  No wheezing  Abdominal:      General: Bowel sounds are normal  There is no distension  Palpations: Abdomen is soft  There is no mass  Tenderness: There is no abdominal tenderness  There is no guarding or rebound  Musculoskeletal: Normal range of motion  Lumbar back: He exhibits tenderness and pain  Back:    Lymphadenopathy:      Cervical: No cervical adenopathy  Skin:     General: Skin is warm and dry  Findings: No erythema or rash     Neurological:      Mental Status: He is alert and oriented to person, place, and time  Motor: Tremor present  Psychiatric:         Behavior: Behavior normal          Thought Content: Thought content normal          Judgment: Judgment normal            BMI Counseling: Body mass index is 30 8 kg/m²  The BMI is above normal  Nutrition recommendations include reducing portion sizes, decreasing overall calorie intake and 3-5 servings of fruits/vegetables daily  Exercise recommendations include exercising 3-5 times per week  Tobacco Cessation Counseling: Tobacco cessation counseling and education was provided  The patient is sincerely urged to quit consumption of tobacco  He is not ready to quit tobacco  The numerous health risks of tobacco consumption were discussed  If he decides to quit, there are a number of helpful adjunctive aids, and he can see me to discuss nicotine replacement therapy, chantix, or bupropion anytime in the future      PHQ-9 Depression Screening    PHQ-9:   Frequency of the following problems over the past two weeks:      Little interest or pleasure in doing things: 0 - not at all  Feeling down, depressed, or hopeless: 0 - not at all  PHQ-2 Score: 0

## 2022-08-31 ENCOUNTER — OFFICE VISIT (OUTPATIENT)
Dept: FAMILY MEDICINE CLINIC | Facility: CLINIC | Age: 37
End: 2022-08-31
Payer: COMMERCIAL

## 2022-08-31 ENCOUNTER — TELEPHONE (OUTPATIENT)
Dept: FAMILY MEDICINE CLINIC | Facility: CLINIC | Age: 37
End: 2022-08-31

## 2022-08-31 VITALS
WEIGHT: 193.2 LBS | BODY MASS INDEX: 28.61 KG/M2 | SYSTOLIC BLOOD PRESSURE: 126 MMHG | OXYGEN SATURATION: 97 % | HEIGHT: 69 IN | HEART RATE: 85 BPM | TEMPERATURE: 96.7 F | DIASTOLIC BLOOD PRESSURE: 92 MMHG

## 2022-08-31 DIAGNOSIS — J01.90 ACUTE SINUSITIS, RECURRENCE NOT SPECIFIED, UNSPECIFIED LOCATION: Primary | ICD-10-CM

## 2022-08-31 PROBLEM — Z72.0 TOBACCO USER: Status: ACTIVE | Noted: 2022-08-31

## 2022-08-31 PROBLEM — G24.3 CERVICAL DYSTONIA: Status: ACTIVE | Noted: 2022-06-23

## 2022-08-31 PROCEDURE — 99213 OFFICE O/P EST LOW 20 MIN: CPT | Performed by: PHYSICIAN ASSISTANT

## 2022-08-31 RX ORDER — AMOXICILLIN 500 MG/1
500 CAPSULE ORAL EVERY 8 HOURS SCHEDULED
Qty: 30 CAPSULE | Refills: 0 | Status: SHIPPED | OUTPATIENT
Start: 2022-08-31 | End: 2022-09-10

## 2022-08-31 NOTE — TELEPHONE ENCOUNTER
Pt called to have an Zpack sent to pharm - Pt was given the message that he needs to have an OV to get a Rx, LOV 3/1/2021

## 2022-08-31 NOTE — PROGRESS NOTES
Assessment/Plan:    Problem List Items Addressed This Visit    None     Visit Diagnoses     Acute sinusitis, recurrence not specified, unspecified location    -  Primary    Relevant Medications    amoxicillin (AMOXIL) 500 mg capsule           Diagnoses and all orders for this visit:    Acute sinusitis, recurrence not specified, unspecified location  -     amoxicillin (AMOXIL) 500 mg capsule; Take 1 capsule (500 mg total) by mouth every 8 (eight) hours for 10 days      Pt to take Amoxil until finished, RTO PRN  Subjective:      Patient ID: Dedra Elizalde is a 44 y o  male  Eduardo Ann is here today complaining of bilateral ear pain x 2 days  Had nasal congestion and cough since last week  He's taken 2 home covid tests, both negative  Denies fevers  The following portions of the patient's history were reviewed and updated as appropriate:   He has a past medical history of Herpes zoster and Tinea corporis  ,  does not have any pertinent problems on file  ,   has a past surgical history that includes Brain surgery; Knee surgery (Left); Insertion / placement / revision neurostimulator (01/2016); and Tooth extraction  ,  family history includes Diabetes in his paternal grandmother; No Known Problems in his mother; Pancreatic cancer in his maternal grandmother  ,   reports that he has been smoking cigarettes  He has never used smokeless tobacco  He reports current alcohol use  He reports that he does not use drugs  ,  has No Known Allergies     Current Outpatient Medications   Medication Sig Dispense Refill    amoxicillin (AMOXIL) 500 mg capsule Take 1 capsule (500 mg total) by mouth every 8 (eight) hours for 10 days 30 capsule 0    primidone (MYSOLINE) 50 mg tablet Take 200 mg by mouth daily 4 tablets daily        propranolol (INDERAL LA) 120 mg 24 hr capsule       cyclobenzaprine (FLEXERIL) 5 mg tablet Take 1 tablet (5 mg total) by mouth daily at bedtime for 7 doses 7 tablet 0     No current facility-administered medications for this visit  Review of Systems   Constitutional: Negative for activity change, appetite change, chills, diaphoresis, fatigue, fever and unexpected weight change  HENT: Positive for congestion and ear pain  Negative for postnasal drip, rhinorrhea, sinus pressure, sinus pain, sneezing, sore throat, tinnitus and voice change  Eyes: Negative for pain, redness and visual disturbance  Respiratory: Positive for cough  Negative for chest tightness, shortness of breath and wheezing  Cardiovascular: Negative for chest pain, palpitations and leg swelling  Gastrointestinal: Negative for abdominal pain, blood in stool, constipation, diarrhea, nausea and vomiting  Genitourinary: Negative for difficulty urinating, dysuria, frequency, hematuria and urgency  Musculoskeletal: Negative for arthralgias, back pain, gait problem, joint swelling, myalgias, neck pain and neck stiffness  Skin: Negative for color change, pallor, rash and wound  Neurological: Negative for dizziness, tremors, weakness, light-headedness and headaches  Psychiatric/Behavioral: Negative for dysphoric mood, self-injury, sleep disturbance and suicidal ideas  The patient is not nervous/anxious  Objective:  Vitals:    08/31/22 1021   BP: 126/92   Pulse: 85   Temp: (!) 96 7 °F (35 9 °C)   SpO2: 97%   Weight: 87 6 kg (193 lb 3 2 oz)   Height: 5' 9" (1 753 m)     Body mass index is 28 53 kg/m²  BMI Counseling: Body mass index is 28 53 kg/m²  The BMI is above normal  Nutrition recommendations include decreasing portion sizes, encouraging healthy choices of fruits and vegetables, decreasing fast food intake, consuming healthier snacks, limiting drinks that contain sugar, moderation in carbohydrate intake, increasing intake of lean protein, reducing intake of saturated and trans fat and reducing intake of cholesterol  Rationale for BMI follow-up plan is due to patient being overweight or obese       Depression Screening and Follow-up Plan: Patient was screened for depression during today's encounter  They screened negative with a PHQ-2 score of 0  Tobacco Cessation Counseling: Tobacco cessation counseling was provided  The patient is sincerely urged to quit consumption of tobacco  He is not ready to quit tobacco         Physical Exam  Vitals reviewed  Constitutional:       General: He is not in acute distress  Appearance: He is well-developed  He is not diaphoretic  HENT:      Head: Normocephalic and atraumatic  Right Ear: Hearing, ear canal and external ear normal  Tympanic membrane is erythematous  Left Ear: Hearing, ear canal and external ear normal  Tympanic membrane is erythematous  Mouth/Throat:      Pharynx: Uvula midline  Posterior oropharyngeal erythema present  No oropharyngeal exudate  Eyes:      General: No scleral icterus  Right eye: No discharge  Left eye: No discharge  Conjunctiva/sclera: Conjunctivae normal    Neck:      Thyroid: No thyromegaly  Vascular: No carotid bruit  Cardiovascular:      Rate and Rhythm: Normal rate and regular rhythm  Heart sounds: Normal heart sounds  No murmur heard  Pulmonary:      Effort: Pulmonary effort is normal  No respiratory distress  Breath sounds: Normal breath sounds  No wheezing  Abdominal:      General: Bowel sounds are normal  There is no distension  Palpations: Abdomen is soft  There is no mass  Tenderness: There is no abdominal tenderness  There is no guarding or rebound  Musculoskeletal:         General: No tenderness  Normal range of motion  Cervical back: Neck supple  Lymphadenopathy:      Cervical: Cervical adenopathy present  Skin:     General: Skin is warm and dry  Findings: No erythema or rash  Neurological:      Mental Status: He is alert and oriented to person, place, and time  Psychiatric:         Behavior: Behavior normal          Thought Content:  Thought content normal          Judgment: Judgment normal

## 2023-02-28 ENCOUNTER — OFFICE VISIT (OUTPATIENT)
Dept: FAMILY MEDICINE CLINIC | Facility: CLINIC | Age: 38
End: 2023-02-28

## 2023-02-28 VITALS
HEIGHT: 69 IN | OXYGEN SATURATION: 97 % | DIASTOLIC BLOOD PRESSURE: 82 MMHG | WEIGHT: 197 LBS | TEMPERATURE: 96.9 F | BODY MASS INDEX: 29.18 KG/M2 | HEART RATE: 69 BPM | SYSTOLIC BLOOD PRESSURE: 132 MMHG

## 2023-02-28 DIAGNOSIS — L82.1 SEBORRHEIC KERATOSIS: Primary | ICD-10-CM

## 2023-02-28 DIAGNOSIS — D22.4 MELANOCYTIC NEVUS OF SCALP: ICD-10-CM

## 2023-02-28 RX ORDER — ESCITALOPRAM OXALATE 5 MG/1
TABLET ORAL
COMMUNITY
Start: 2023-02-25

## 2023-02-28 RX ORDER — INCOBOTULINUMTOXINA 100 [USP'U]/1
INJECTION, POWDER, LYOPHILIZED, FOR SOLUTION INTRAMUSCULAR
COMMUNITY
Start: 2023-02-09

## 2023-02-28 NOTE — PROGRESS NOTES
BMI Counseling: Body mass index is 29 09 kg/m²  The BMI is above normal  Nutrition recommendations include decreasing portion sizes, encouraging healthy choices of fruits and vegetables, moderation in carbohydrate intake and increasing intake of lean protein  Rationale for BMI follow-up plan is due to patient being overweight or obese  Assessment/Plan:    Problem List Items Addressed This Visit        Musculoskeletal and Integument    Seborrheic keratosis - Primary   Other Visit Diagnoses     Melanocytic nevus of scalp               Diagnoses and all orders for this visit:    Seborrheic keratosis    Melanocytic nevus of scalp        No problem-specific Assessment & Plan notes found for this encounter  Subjective:      Patient ID: Tenisha Lomas is a 44 y o  male  Kristofer Dorsey is here with a lesion on the right posterior auricular region of his scalp his neurologist at [facility] who gives him Botox shots for his cervical dystonia noticed that she feels that it has variations in color and recommended he have it checked it looks like it may be a seborrheic keratosis with multiple colors and it good to refer him to dermatology      The following portions of the patient's history were reviewed and updated as appropriate:   He has a past medical history of Herpes zoster and Tinea corporis  ,  does not have any pertinent problems on file  ,   has a past surgical history that includes Brain surgery; Knee surgery (Left); Insertion / placement / revision neurostimulator (01/2016); and Tooth extraction  ,  family history includes Diabetes in his paternal grandmother; No Known Problems in his mother; Pancreatic cancer in his maternal grandmother  ,   reports that he has been smoking cigarettes  He has been smoking an average of  5 packs per day  He has never used smokeless tobacco  He reports current alcohol use  He reports that he does not use drugs  ,  has No Known Allergies     Current Outpatient Medications   Medication Sig Dispense Refill   • escitalopram (LEXAPRO) 5 mg tablet      • primidone (MYSOLINE) 50 mg tablet Take 200 mg by mouth daily 4 tablets daily  • propranolol (INDERAL LA) 120 mg 24 hr capsule      • Xeomin 100 units SOLR        No current facility-administered medications for this visit  Review of Systems   Constitutional: Negative for activity change, appetite change, diaphoresis, fatigue and fever  HENT: Negative  Eyes: Negative  Respiratory: Negative for apnea, cough, chest tightness, shortness of breath and wheezing  Cardiovascular: Negative for chest pain, palpitations and leg swelling  Gastrointestinal: Negative for abdominal distention, abdominal pain, anal bleeding, constipation, diarrhea, nausea and vomiting  Endocrine: Negative for cold intolerance, heat intolerance, polydipsia, polyphagia and polyuria  Genitourinary: Negative for difficulty urinating, dysuria, flank pain, hematuria and urgency  Musculoskeletal: Negative for arthralgias, back pain, gait problem, joint swelling and myalgias  Skin: Negative for color change, rash and wound  Pigmented lesion about the size of a dime behind the right ear   Allergic/Immunologic: Negative for environmental allergies, food allergies and immunocompromised state  Neurological: Negative for dizziness, seizures, syncope, speech difficulty, numbness and headaches  Hematological: Negative for adenopathy  Does not bruise/bleed easily  Psychiatric/Behavioral: Negative for agitation, behavioral problems, hallucinations, sleep disturbance and suicidal ideas  Objective:  Vitals:    02/28/23 1651   BP: 132/82   BP Location: Left arm   Patient Position: Sitting   Cuff Size: Large   Pulse: 69   Temp: (!) 96 9 °F (36 1 °C)   TempSrc: Temporal   SpO2: 97%   Weight: 89 4 kg (197 lb)   Height: 5' 9" (1 753 m)     Body mass index is 29 09 kg/m²  Physical Exam  Constitutional:       General: He is not in acute distress  Appearance: He is well-developed  He is not diaphoretic  HENT:      Head: Normocephalic  Right Ear: External ear normal       Left Ear: External ear normal       Nose: Nose normal    Eyes:      General: No scleral icterus  Right eye: No discharge  Left eye: No discharge  Conjunctiva/sclera: Conjunctivae normal       Pupils: Pupils are equal, round, and reactive to light  Neck:      Thyroid: No thyromegaly  Trachea: No tracheal deviation  Cardiovascular:      Rate and Rhythm: Normal rate and regular rhythm  Heart sounds: Normal heart sounds  No murmur heard  No friction rub  No gallop  Pulmonary:      Effort: Pulmonary effort is normal  No respiratory distress  Breath sounds: Normal breath sounds  No wheezing  Abdominal:      General: Bowel sounds are normal       Palpations: Abdomen is soft  There is no mass  Tenderness: There is no abdominal tenderness  There is no guarding  Musculoskeletal:         General: No deformity  Cervical back: Normal range of motion  Lymphadenopathy:      Cervical: No cervical adenopathy  Skin:     General: Skin is warm and dry  Findings: No erythema or rash  Neurological:      Mental Status: He is alert and oriented to person, place, and time  Cranial Nerves: No cranial nerve deficit  Psychiatric:         Thought Content:  Thought content normal

## 2023-03-04 ENCOUNTER — HOSPITAL ENCOUNTER (EMERGENCY)
Facility: HOSPITAL | Age: 38
Discharge: HOME | End: 2023-03-04
Attending: EMERGENCY MEDICINE | Admitting: EMERGENCY MEDICINE

## 2023-03-04 ENCOUNTER — APPOINTMENT (EMERGENCY)
Dept: RADIOLOGY | Facility: HOSPITAL | Age: 38
End: 2023-03-04

## 2023-03-04 VITALS
HEART RATE: 101 BPM | SYSTOLIC BLOOD PRESSURE: 124 MMHG | BODY MASS INDEX: 29.18 KG/M2 | TEMPERATURE: 98.9 F | HEIGHT: 69 IN | RESPIRATION RATE: 16 BRPM | OXYGEN SATURATION: 96 % | DIASTOLIC BLOOD PRESSURE: 76 MMHG | WEIGHT: 197 LBS

## 2023-03-04 DIAGNOSIS — S93.492A SPRAIN OF ANTERIOR TALOFIBULAR LIGAMENT OF LEFT ANKLE, INITIAL ENCOUNTER: Primary | ICD-10-CM

## 2023-03-04 RX ORDER — IBUPROFEN 600 MG/1
600 TABLET ORAL EVERY 6 HOURS PRN
Qty: 30 TABLET | Refills: 0 | Status: SHIPPED | OUTPATIENT
Start: 2023-03-04

## 2023-03-04 RX ORDER — IBUPROFEN 600 MG/1
600 TABLET ORAL ONCE
Status: COMPLETED | OUTPATIENT
Start: 2023-03-04 | End: 2023-03-04

## 2023-03-04 RX ORDER — ACETAMINOPHEN 325 MG/1
975 TABLET ORAL ONCE
Status: COMPLETED | OUTPATIENT
Start: 2023-03-04 | End: 2023-03-04

## 2023-03-04 RX ADMIN — ACETAMINOPHEN 975 MG: 325 TABLET, FILM COATED ORAL at 07:36

## 2023-03-04 RX ADMIN — IBUPROFEN 600 MG: 600 TABLET, FILM COATED ORAL at 07:36

## 2023-03-04 NOTE — Clinical Note
Coty Reyes was seen and treated in our emergency department on 3/4/2023  Diagnosis:     Rodrigue Nieto  may return to work on return date  He may return on this date: 03/09/2023         If you have any questions or concerns, please don't hesitate to call        Abdulkadir Matute DO    ______________________________           _______________          _______________  Hospital Representative                              Date                                Time

## 2023-03-04 NOTE — ED PROVIDER NOTES
-History  Chief Complaint   Patient presents with   • Ankle Injury     Patient presents with left ankle pain after falling down 6 steps yesterday  Denies head strike, no altered loc, no thinners  Patient is a 54-year-old male at approximate 11:00 pm last evening Twisted his ankle while walking downstairs  Patient states he fell down approximately 5 steps while at home  He denies any other injuries  Did not strike his head  Does not complain of any neck pain hip pain, back pain, abdominal pain, headache  Patient complains of left ankle pain  Patient is able to bear weight but very tender  on that ankle  No previous injury to that ankle  No anticoagulation  Will check x-ray and reassess  Ice  Motrin  Prior to Admission Medications   Prescriptions Last Dose Informant Patient Reported? Taking? Xeomin 100 units SOLR   Yes No   escitalopram (LEXAPRO) 5 mg tablet   Yes No   primidone (MYSOLINE) 50 mg tablet   Yes No   Sig: Take 200 mg by mouth daily 4 tablets daily  propranolol (INDERAL LA) 120 mg 24 hr capsule   Yes No      Facility-Administered Medications: None       Past Medical History:   Diagnosis Date   • Herpes zoster    • Tinea corporis        Past Surgical History:   Procedure Laterality Date   • BRAIN SURGERY     • INSERTION / [employer]vone Must / REVISION NEUROSTIMULATOR  01/2016    with LED replacement 1/2017   • KNEE SURGERY Left    • TOOTH EXTRACTION         Family History   Problem Relation Age of Onset   • No Known Problems Mother    • Pancreatic cancer Maternal Grandmother    • Diabetes Paternal Grandmother      I have reviewed and agree with the history as documented      E-Cigarette/Vaping   • E-Cigarette Use Never User      E-Cigarette/Vaping Substances     Social History     Tobacco Use   • Smoking status: Every Day     Packs/day: 0 50     Types: Cigarettes   • Smokeless tobacco: Never   Vaping Use   • Vaping Use: Never used   Substance Use Topics   • Alcohol use: Yes     Comment: socially   • Drug use: Never       Review of Systems   Constitutional: Negative for activity change, appetite change, chills and fever  HENT: Negative for ear pain, hearing loss, rhinorrhea, sneezing, sore throat and trouble swallowing  Eyes: Negative for pain and visual disturbance  Respiratory: Negative for cough, choking, chest tightness, shortness of breath, wheezing and stridor  Cardiovascular: Negative for chest pain, palpitations and leg swelling  Gastrointestinal: Negative for abdominal pain, constipation, diarrhea, nausea and vomiting  Genitourinary: Negative for difficulty urinating, dysuria, frequency, hematuria and testicular pain  Musculoskeletal: Negative for arthralgias, back pain, gait problem and neck pain  Skin: Negative for color change and rash  Allergic/Immunologic: Negative for immunocompromised state  Neurological: Negative for dizziness, seizures, syncope, speech difficulty, weakness, light-headedness, numbness and headaches  Psychiatric/Behavioral: Negative for confusion  The patient is not nervous/anxious  All other systems reviewed and are negative  Physical Exam  Physical Exam  Vitals and nursing note reviewed  Constitutional:       General: He is not in acute distress  Appearance: Normal appearance  He is well-developed and normal weight  He is not ill-appearing, toxic-appearing or diaphoretic  HENT:      Head: Normocephalic and atraumatic  Nose: Nose normal       Mouth/Throat:      Mouth: Mucous membranes are moist       Pharynx: Oropharynx is clear  Eyes:      General: No scleral icterus  Extraocular Movements: Extraocular movements intact  Conjunctiva/sclera: Conjunctivae normal    Cardiovascular:      Rate and Rhythm: Normal rate and regular rhythm  Pulses: Normal pulses  Heart sounds: Normal heart sounds  No murmur heard  Pulmonary:      Effort: Pulmonary effort is normal  No respiratory distress        Breath sounds: Normal breath sounds  No wheezing or rales  Chest:      Chest wall: No tenderness  Abdominal:      General: Bowel sounds are normal  There is no distension  Palpations: Abdomen is soft  There is no mass  Tenderness: There is no abdominal tenderness  There is no right CVA tenderness, guarding or rebound  Musculoskeletal:         General: Swelling, tenderness and signs of injury present  No deformity  Normal range of motion  Cervical back: Normal range of motion and neck supple  No rigidity  Right lower leg: Normal  No edema  Left lower leg: Swelling, tenderness and bony tenderness present  No lacerations  No edema  Legs:       Comments: Swelling and tenderness over the lateral malleolus on the left ankle  Pain worsened with inversion  No tenderness over the proximal fibular head  No crepitus  No tenderness over the fifth metatarsal   Lymphadenopathy:      Cervical: No cervical adenopathy  Skin:     General: Skin is warm and dry  Capillary Refill: Capillary refill takes less than 2 seconds  Findings: No erythema or rash  Neurological:      General: No focal deficit present  Mental Status: He is alert and oriented to person, place, and time  Motor: No abnormal muscle tone     Psychiatric:         Mood and Affect: Mood normal          Behavior: Behavior normal          Vital Signs  ED Triage Vitals   Temperature Pulse Respirations Blood Pressure SpO2   03/04/23 0730 03/04/23 0730 03/04/23 0730 03/04/23 0730 03/04/23 0730   98 9 °F (37 2 °C) 101 16 124/76 96 %      Temp Source Heart Rate Source Patient Position - Orthostatic VS BP Location FiO2 (%)   03/04/23 0730 03/04/23 0730 03/04/23 0730 03/04/23 0730 --   Temporal Monitor Lying Left arm       Pain Score       03/04/23 0736       5           Vitals:    03/04/23 0730   BP: 124/76   Pulse: 101   Patient Position - Orthostatic VS: Lying         Visual Acuity      ED Medications  Medications   ibuprofen (MOTRIN) tablet 600 mg (600 mg Oral Given 3/4/23 0736)   acetaminophen (TYLENOL) tablet 975 mg (975 mg Oral Given 3/4/23 0736)       Diagnostic Studies  Results Reviewed     None                 XR ankle 3+ views LEFT    (Results Pending)          Wet read: No acute fracture or dislocation  There is swelling around the lateral malleolus    Procedures  Procedures         ED Course                               SBIRT 22yo+    Flowsheet Row Most Recent Value   SBIRT (23 yo +)    In order to provide better care to our patients, we are screening all of our patients for alcohol and drug use  Would it be okay to ask you these screening questions? Yes Filed at: 03/04/2023 0740   Initial Alcohol Screen: US AUDIT-C     1  How often do you have a drink containing alcohol? 0 Filed at: 03/04/2023 0740   2  How many drinks containing alcohol do you have on a typical day you are drinking? 0 Filed at: 03/04/2023 0740   3a  Male UNDER 65: How often do you have five or more drinks on one occasion? 0 Filed at: 03/04/2023 0740   3b  FEMALE Any Age, or MALE 65+: How often do you have 4 or more drinks on one occassion? 0 Filed at: 03/04/2023 0740   Audit-C Score 0 Filed at: 03/04/2023 0740   BIANCA: How many times in the past year have you    Used an illegal drug or used a prescription medication for non-medical reasons? Never Filed at: 03/04/2023 0740                    Medical Decision Making  39-year-old male with left ankle injury that occurred approximately 8-1/2 hours ago  Able to bear weight but tender  Amount and/or Complexity of Data Reviewed  Independent Historian: spouse     Details: Discussed and updated wife at bedside  Radiology: ordered and independent interpretation performed  Decision-making details documented in ED Course  Details: My independent interpretation of the left ankle x-ray is negative for any acute fracture or dislocation  There is soft tissue swelling along the lateral malleolus    Discussion of management or test interpretation with external provider(s): MICHAEL HELMS reviewed    Risk  OTC drugs  Prescription drug management  Risk Details: No other traumatic injuries  Follow-up with orthopedic surgery  Disposition  Final diagnoses:   Sprain of anterior talofibular ligament of left ankle, initial encounter     Time reflects when diagnosis was documented in both MDM as applicable and the Disposition within this note     Time User Action Codes Description Comment    3/4/2023  7:40 AM Gerson Celi KETAN Add [V11 220A] Sprain of anterior talofibular ligament of left ankle, initial encounter       ED Disposition     ED Disposition   Discharge    Condition   Stable    Date/Time   Sat Mar 4, 2023  7:40 AM    Comment   [address] discharge to home/self care  Follow-up Information     Follow up With Specialties Details Why Contact Info Additional Information    Vanita Dai DO Family Medicine Schedule an appointment as soon as possible for a visit   [address] [address]       [address] [facility] Schedule an appointment as soon as possible for a visit   [address] [facility] [id number]  [address] [place] [address] [facility] [address], [place], [address]          Patient's Medications   Discharge Prescriptions    IBUPROFEN (MOTRIN) 600 MG TABLET    Take 1 tablet (600 mg total) by mouth every 6 (six) hours as needed for mild pain       Start Date: 3/4/2023  End Date: --       Order Dose: 600 mg       Quantity: 30 tablet    Refills: 0       No discharge procedures on file      PDMP Review     None          ED Provider  Electronically Signed by           Devi Meyer DO  03/04/23 DO Tutu  03/04/23 9713

## 2023-03-06 ENCOUNTER — TELEPHONE (OUTPATIENT)
Dept: DERMATOLOGY | Facility: CLINIC | Age: 38
End: 2023-03-06

## 2024-05-21 ENCOUNTER — APPOINTMENT (EMERGENCY)
Dept: CT IMAGING | Facility: HOSPITAL | Age: 39
End: 2024-05-21
Payer: COMMERCIAL

## 2024-05-21 ENCOUNTER — HOSPITAL ENCOUNTER (EMERGENCY)
Facility: HOSPITAL | Age: 39
End: 2024-05-21
Attending: EMERGENCY MEDICINE
Payer: COMMERCIAL

## 2024-05-21 VITALS
TEMPERATURE: 97.1 F | BODY MASS INDEX: 28.13 KG/M2 | WEIGHT: 190.48 LBS | RESPIRATION RATE: 20 BRPM | OXYGEN SATURATION: 92 % | SYSTOLIC BLOOD PRESSURE: 104 MMHG | HEART RATE: 87 BPM | DIASTOLIC BLOOD PRESSURE: 69 MMHG

## 2024-05-21 DIAGNOSIS — R29.90 STROKE-LIKE SYMPTOMS: Primary | ICD-10-CM

## 2024-05-21 PROBLEM — I63.9 STROKE (CEREBRUM) (HCC): Status: ACTIVE | Noted: 2024-05-21

## 2024-05-21 LAB
ANION GAP SERPL CALCULATED.3IONS-SCNC: 9 MMOL/L (ref 4–13)
APTT PPP: 30 SECONDS (ref 23–37)
BUN SERPL-MCNC: 6 MG/DL (ref 5–25)
CALCIUM SERPL-MCNC: 9.2 MG/DL (ref 8.4–10.2)
CARDIAC TROPONIN I PNL SERPL HS: <2 NG/L
CARDIAC TROPONIN I PNL SERPL HS: <2 NG/L
CHLORIDE SERPL-SCNC: 99 MMOL/L (ref 96–108)
CO2 SERPL-SCNC: 28 MMOL/L (ref 21–32)
CREAT SERPL-MCNC: 0.84 MG/DL (ref 0.6–1.3)
ERYTHROCYTE [DISTWIDTH] IN BLOOD BY AUTOMATED COUNT: 12.4 % (ref 11.6–15.1)
FLUAV RNA RESP QL NAA+PROBE: NEGATIVE
FLUBV RNA RESP QL NAA+PROBE: NEGATIVE
GFR SERPL CREATININE-BSD FRML MDRD: 109 ML/MIN/1.73SQ M
GLUCOSE SERPL-MCNC: 111 MG/DL (ref 65–140)
GLUCOSE SERPL-MCNC: 91 MG/DL (ref 65–140)
HCT VFR BLD AUTO: 47 % (ref 36.5–49.3)
HGB BLD-MCNC: 15.6 G/DL (ref 12–17)
INR PPP: 0.96 (ref 0.84–1.19)
MCH RBC QN AUTO: 29.7 PG (ref 26.8–34.3)
MCHC RBC AUTO-ENTMCNC: 33.2 G/DL (ref 31.4–37.4)
MCV RBC AUTO: 90 FL (ref 82–98)
PLATELET # BLD AUTO: 259 THOUSANDS/UL (ref 149–390)
PMV BLD AUTO: 8.8 FL (ref 8.9–12.7)
POTASSIUM SERPL-SCNC: 3.9 MMOL/L (ref 3.5–5.3)
PROTHROMBIN TIME: 12.7 SECONDS (ref 11.6–14.5)
RBC # BLD AUTO: 5.25 MILLION/UL (ref 3.88–5.62)
RSV RNA RESP QL NAA+PROBE: NEGATIVE
SARS-COV-2 RNA RESP QL NAA+PROBE: NEGATIVE
SODIUM SERPL-SCNC: 136 MMOL/L (ref 135–147)
WBC # BLD AUTO: 7.69 THOUSAND/UL (ref 4.31–10.16)

## 2024-05-21 PROCEDURE — 99291 CRITICAL CARE FIRST HOUR: CPT | Performed by: PSYCHIATRY & NEUROLOGY

## 2024-05-21 PROCEDURE — 99285 EMERGENCY DEPT VISIT HI MDM: CPT

## 2024-05-21 PROCEDURE — 85610 PROTHROMBIN TIME: CPT | Performed by: EMERGENCY MEDICINE

## 2024-05-21 PROCEDURE — 36415 COLL VENOUS BLD VENIPUNCTURE: CPT | Performed by: EMERGENCY MEDICINE

## 2024-05-21 PROCEDURE — 0241U HB NFCT DS VIR RESP RNA 4 TRGT: CPT | Performed by: EMERGENCY MEDICINE

## 2024-05-21 PROCEDURE — 85027 COMPLETE CBC AUTOMATED: CPT | Performed by: EMERGENCY MEDICINE

## 2024-05-21 PROCEDURE — 93005 ELECTROCARDIOGRAM TRACING: CPT

## 2024-05-21 PROCEDURE — 80048 BASIC METABOLIC PNL TOTAL CA: CPT | Performed by: EMERGENCY MEDICINE

## 2024-05-21 PROCEDURE — 85730 THROMBOPLASTIN TIME PARTIAL: CPT | Performed by: EMERGENCY MEDICINE

## 2024-05-21 PROCEDURE — 70498 CT ANGIOGRAPHY NECK: CPT

## 2024-05-21 PROCEDURE — 82948 REAGENT STRIP/BLOOD GLUCOSE: CPT

## 2024-05-21 PROCEDURE — 99291 CRITICAL CARE FIRST HOUR: CPT | Performed by: EMERGENCY MEDICINE

## 2024-05-21 PROCEDURE — 84484 ASSAY OF TROPONIN QUANT: CPT | Performed by: EMERGENCY MEDICINE

## 2024-05-21 PROCEDURE — 70496 CT ANGIOGRAPHY HEAD: CPT

## 2024-05-21 RX ADMIN — Medication 22 MG: at 20:35

## 2024-05-21 NOTE — ED PROVIDER NOTES
Final Diagnosis:  1. Stroke-like symptoms        Chief Complaint   Patient presents with    CVA/TIA-like Symptoms     EMS arrival, stroke like symptoms, R side weakness, R facial droop, tongue to L     HPI  Patient presents for stroke alert.  Per EMS patient's last known normal was approximately 1815.  They were then contacted around 1919 15.  Patient had gone upstairs to take a nap and then after his nap wife noticed that he was markedly different. Facial droop, difficulty understanding his speech.  This caused him to call EMS.  Per EMS call he was having right-sided upper and lower extremity weakness, tongue deviates to the left, facial right-sided facial droop.  Some dysarthria.    Patient denies any pain.  No history of blood clots.  No history of blood thinners.    Review of records show that he follows at [place].  He has had a deep brain stimulator that has been in place at least since 2015.  It appears that he had a lead replaced in June 2023.  This has apparently helped control his underlying tremors.      Unless otherwise specified:  - No language barrier.   - History obtained from patient.   - There are no limitations to the history obtained.  - Previous charting was reviewed    PMH:   has a past medical history of Herpes zoster and Tinea corporis.    PSH:   has a past surgical history that includes Brain surgery; Knee surgery (Left); Insertion / placement / revision neurostimulator (01/2016); and Tooth extraction.       ROS:  Review of Systems   - 13 point ROS was performed and all are normal unless stated in the history above.   - Nursing note reviewed. Vitals reviewed.   - Orders placed by myself and/or advanced practitioner / resident.        PE:   Vitals:    05/21/24 2035 05/21/24 2050 05/21/24 2105 05/21/24 2120   BP: 120/78 110/71 124/76 110/67   BP Location:    Left arm   Pulse: 84 84 89 82   Resp: 20 18 (!) 25 20   Temp:       TempSrc:       SpO2: 92% 92% 92%    Weight:         Vitals reviewed by  me.     Patient is nondistressed in bed.  Somewhat slow to respond at times.  He does have some dysarthria.  Weakness in right upper and right lower extremity when compared to left.  Patient was on CT scanner during interview so no other maneuvers were performed.  No obvious signs of trauma.  Unless otherwise specified above:    General: VS reviewed  Appears in NAD    Head: Normocephalic, atraumatic  Eyes: EOM-I.     ENT: Atraumatic external nose and ears.    No drooling.     Neck: No JVD.    CV: No pallor noted  Lungs:   No tachypnea  No respiratory distress    Abdomen:  Soft, non-tender, non-distended    MSK:   No obvious deformity    Skin: Dry, intact. No obvious rash.    Psychiatric/Behavioral: Appropriate mood and affect   Exam: deferred    Physical Exam     CriticalCare Time    Date/Time: 5/21/2024 9:33 PM    Performed by: Darnell Maier MD  Authorized by: Darnell Maier MD    Critical care provider statement:     Critical care time (minutes):  37    Critical care was necessary to treat or prevent imminent or life-threatening deterioration of the following conditions:  CNS failure or compromise    Critical care was time spent personally by me on the following activities:  Obtaining history from patient or surrogate, development of treatment plan with patient or surrogate, discussions with consultants, examination of patient, review of old charts, re-evaluation of patient's condition, ordering and review of radiographic studies, ordering and review of laboratory studies and ordering and performing treatments and interventions     A:  - Nursing note reviewed.                      CT stroke alert brain   Final Result      No acute intracranial hemorrhage or large mass effect, within the confines of extensive streak artifact from the stimulator leads that degrades evaluation of the adjacent tissues.      Findings were directly discussed with DARNELL MAIER at approximately 8:15 p.m.      Workstation  performed: [id number]         CTA stroke alert (head/neck)   Final Result      No proximal large vessel occlusion in the head and neck or significant stenosis.      Findings were directly discussed with LAURA MAIER at approximately 8:15 p.m.                        Workstation performed: [id number]         CT head wo contrast    (Results Pending)     Orders Placed This Encounter   Procedures    Critical Care    FLU/RSV/COVID - if FLU/RSV clinically relevant    CT stroke alert brain    CTA stroke alert (head/neck)    CT head wo contrast    Basic metabolic panel    CBC and Platelet    Protime-INR    APTT    HS Troponin 0hr (reflex protocol)    HS Troponin I 2hr    HS Troponin I 4hr    Continuous cardiac monitoring    Continuous pulse oximetry    Neuro checks    Weigh patient and record    Insert peripheral IV    Nursing dysphagia Screen    Nasal cannula oxygen    Fingerstick Glucose (POCT)    Vital signs for thrombolytic administration    Neuro checks    Thrombolytic Administration - Notify Physician    Notify physician    Thrombolytic Administration Instruction    Thrombolytic Administration Instruction    Consult to Neurology    Contact and airborne isolation status    ECG 12 lead    ECG 12 lead    Transfer to other facility     Labs Reviewed   CBC AND PLATELET - Abnormal       Result Value Ref Range Status    WBC 7.69  4.31 - 10.16 Thousand/uL Final    RBC 5.25  3.88 - 5.62 Million/uL Final    Hemoglobin 15.6  12.0 - 17.0 g/dL Final    Hematocrit 47.0  36.5 - 49.3 % Final    MCV 90  82 - 98 fL Final    MCH 29.7  26.8 - 34.3 pg Final    MCHC 33.2  31.4 - 37.4 g/dL Final    RDW 12.4  11.6 - 15.1 % Final    Platelets 259  149 - 390 Thousands/uL Final    MPV 8.8 (*) 8.9 - 12.7 fL Final   PROTIME-INR - Normal    Protime 12.7  11.6 - 14.5 seconds Final    INR 0.96  0.84 - 1.19 Final   APTT - Normal    PTT 30  23 - 37 seconds Final    Comment: Therapeutic Heparin Range =  60-90 seconds   HS TROPONIN I 0HR - Normal    hs  "TnI 0hr <2  \"Refer to ACS Flowchart\"- see link ng/L Final    Comment:                                              Initial (time 0) result  If >=50 ng/L, Myocardial injury suggested ;  Type of myocardial injury and treatment strategy  to be determined.  If 5-49 ng/L, a delta result at 2 hours and or 4 hours will be needed to further evaluate.  If <4 ng/L, and chest pain has been >3 hours since onset, patient may qualify for discharge based on the HEART score in the ED.  If <5 ng/L and <3hours since onset of chest pain, a delta result at 2 hours will be needed to further evaluate.    HS Troponin 99th Percentile URL of a Health Population=12 ng/L with a 95% Confidence Interval of 8-18 ng/L.    Second Troponin (time 2 hours)  If calculated delta >= 20 ng/L,  Myocardial injury suggested ; Type of myocardial injury and treatment strategy to be determined.  If 5-49 ng/L and the calculated delta is 5-19 ng/L, consult medical service for evaluation.  Continue evaluation for ischemia on ecg and other possible etiology and repeat hs troponin at 4 hours.  If delta is <5 ng/L at 2 hours, consider discharge based on risk stratification via the HEART score (if in ED), or TODD risk score in IP/Observation.    HS Troponin 99th Percentile URL of a Health Population=12 ng/L with a 95% Confidence Interval of 8-18 ng/L.   POCT GLUCOSE - Normal    POC Glucose 111  65 - 140 mg/dl Final   COVID19, INFLUENZA A/B, RSV PCR, [facility]   BASIC METABOLIC PANEL    Sodium 136  135 - 147 mmol/L Final    Potassium 3.9  3.5 - 5.3 mmol/L Final    Chloride 99  96 - 108 mmol/L Final    CO2 28  21 - 32 mmol/L Final    ANION GAP 9  4 - 13 mmol/L Final    BUN 6  5 - 25 mg/dL Final    Creatinine 0.84  0.60 - 1.30 mg/dL Final    Comment: Standardized to IDMS reference method    Glucose 91  65 - 140 mg/dL Final    Comment: If the patient is fasting, the ADA then defines impaired fasting glucose as > 100 mg/dL and diabetes as > or equal to 123 mg/dL.    Calcium " 9.2  8.4 - 10.2 mg/dL Final    eGFR 109  ml/min/1.73sq m Final    Narrative:     National Kidney Disease Foundation guidelines for Chronic Kidney Disease (CKD):     Stage 1 with normal or high GFR (GFR > 90 mL/min/1.73 square meters)    Stage 2 Mild CKD (GFR = 60-89 mL/min/1.73 square meters)    Stage 3A Moderate CKD (GFR = 45-59 mL/min/1.73 square meters)    Stage 3B Moderate CKD (GFR = 30-44 mL/min/1.73 square meters)    Stage 4 Severe CKD (GFR = 15-29 mL/min/1.73 square meters)    Stage 5 End Stage CKD (GFR <15 mL/min/1.73 square meters)  Note: GFR calculation is accurate only with a steady state creatinine   HS TROPONIN I 2HR   HS TROPONIN I 4HR         Final Diagnosis:  1. Stroke-like symptoms        P:  -Patient presents as prehospital stroke alert.  Will provide CT imaging, labs, discussed with neurology.  - CTs without acute pathology though this was sometimes limited by the presence of lead wires.  I reviewed this with neurology.  Suggest to give TNK.  - I reviewed this with patient and family at bedside.  They would like to proceed with administration of TNK.  We have this reasonable.  - Patient's neurofindings consistently improved in the emergency department.  Reviewed with critical care.  Patient will be transferred to [facility] for continued workup and monitoring.      Unless otherwise noted the patient's medications were reviewed and they should continue as directed.    Unless otherwise specified:  CC is exclusive from any separately billable procedures  CC is exclusive of treating other patients  CC is exclusive of teaching time   All splints are static    Medications   iohexol (OMNIPAQUE) 350 MG/ML injection (MULTI-DOSE) 85 mL (has no administration in time range)   tenecteplase (TNKase) injection 22 mg (22 mg Intravenous Given 5/21/24 2035)     Time reflects when diagnosis was documented in both MDM as applicable and the Disposition within this note       Time User Action Codes Description  Comment    5/21/2024  7:47 PM Darnell Leigh Add [R29.90] Stroke-like symptoms           ED Disposition       ED Disposition   Transfer to Another Facility-In Network    Condition   --    Date/Time   Tue May 21, 2024  9:14 PM    Comment   Phani Adkins should be transferred out to [place].               MD Documentation      Flowsheet Row Most Recent Value   Patient Condition The patient has been stabilized such that within reasonable medical probability, no material deterioration of the patient condition or the condition of the unborn child(luis) is likely to result from the transfer   Reason for Transfer Level of Care needed not available at this facility   Benefits of Transfer Specialized equipment and/or services available at the receiving facility (Include comment)________________________  [ICU]   Risks of Transfer Potential for delay in receiving treatment, Potential deterioration of medical condition, Increased discomfort during transfer, Loss of IV, Possible worsening of condition or death during transfer   Accepting Physician Chavis   Accepting Facility Name, [facility]    (Name & Tel number) [phone]   Transported by (Company and Unit #) [employer]   Sending MD Leigh   Provider Certification General risk, such as traffic hazards, adverse weather conditions, rough terrain or turbulence, possible failure of equipment (including vehicle or aircraft), or consequences of actions of persons outside the control of the transport personnel, Unanticipated needs of medical equipment and personnel during transport, Risk of worsening condition, The possibility of a transport vehicle being unavailable          RN Documentation      Flowsheet Row Most Recent Value   Accepting Facility Name, [facility]    (Name & Tel number) [phone]   Transported by (Company and Unit #) [employer]          Follow-up Information    None       Patient's Medications   Discharge  "Prescriptions    No medications on file     No discharge procedures on file.  Prior to Admission Medications   Prescriptions Last Dose Informant Patient Reported? Taking?   Xeomin 100 units SOLR   Yes No   escitalopram (LEXAPRO) 5 mg tablet   Yes No   ibuprofen (MOTRIN) 600 mg tablet   No No   Sig: Take 1 tablet (600 mg total) by mouth every 6 (six) hours as needed for mild pain   primidone (MYSOLINE) 50 mg tablet  Self Yes No   Sig: Take 200 mg by mouth daily 4 tablets daily.   propranolol (INDERAL LA) 120 mg 24 hr capsule   Yes No      Facility-Administered Medications: None       Portions of the record may have been created with voice recognition software. Occasional wrong word or \"sound a like\" substitutions may have occurred due to the inherent limitations of voice recognition software. Read the chart carefully and recognize, using context, where substitutions have occurred.    Electronically signed by:  MD Darnell Morales MD  05/21/24 [id number]    "

## 2024-05-22 ENCOUNTER — APPOINTMENT (INPATIENT)
Dept: CT IMAGING | Facility: HOSPITAL | Age: 39
DRG: 065 | End: 2024-05-22
Payer: COMMERCIAL

## 2024-05-22 ENCOUNTER — APPOINTMENT (INPATIENT)
Dept: NON INVASIVE DIAGNOSTICS | Facility: HOSPITAL | Age: 39
DRG: 065 | End: 2024-05-22
Payer: COMMERCIAL

## 2024-05-22 ENCOUNTER — HOSPITAL ENCOUNTER (INPATIENT)
Facility: HOSPITAL | Age: 39
LOS: 1 days | Discharge: HOME | DRG: 065 | End: 2024-05-23
Attending: INTERNAL MEDICINE | Admitting: INTERNAL MEDICINE
Payer: COMMERCIAL

## 2024-05-22 DIAGNOSIS — I63.9 STROKE (CEREBRUM) (HCC): Primary | ICD-10-CM

## 2024-05-22 PROBLEM — R29.90 STROKE-LIKE SYMPTOMS: Status: ACTIVE | Noted: 2024-05-22

## 2024-05-22 PROBLEM — F10.10 ALCOHOL ABUSE: Status: ACTIVE | Noted: 2024-05-22

## 2024-05-22 LAB
ALBUMIN SERPL BCP-MCNC: 4.2 G/DL (ref 3.5–5)
ALP SERPL-CCNC: 71 U/L (ref 34–104)
ALT SERPL W P-5'-P-CCNC: 25 U/L (ref 7–52)
ANION GAP SERPL CALCULATED.3IONS-SCNC: 11 MMOL/L (ref 4–13)
AORTIC ROOT: 3.6 CM
AORTIC VALVE MEAN VELOCITY: 6.2 M/S
APICAL FOUR CHAMBER EJECTION FRACTION: 57 %
AST SERPL W P-5'-P-CCNC: 15 U/L (ref 13–39)
AV AREA BY CONTINUOUS VTI: 3.4 CM2
AV AREA PEAK VELOCITY: 3.4 CM2
AV LVOT MEAN GRADIENT: 1 MMHG
AV LVOT PEAK GRADIENT: 3 MMHG
AV MEAN GRADIENT: 2 MMHG
AV PEAK GRADIENT: 3 MMHG
AV VALVE AREA: 3.37 CM2
AV VELOCITY RATIO: 0.9
BASOPHILS # BLD AUTO: 0.06 THOUSANDS/ÂΜL (ref 0–0.1)
BASOPHILS NFR BLD AUTO: 1 % (ref 0–1)
BILIRUB SERPL-MCNC: 0.43 MG/DL (ref 0.2–1)
BSA FOR ECHO PROCEDURE: 1.97 M2
BUN SERPL-MCNC: 6 MG/DL (ref 5–25)
CA-I BLD-SCNC: 1.25 MMOL/L (ref 1.12–1.32)
CALCIUM SERPL-MCNC: 8.8 MG/DL (ref 8.4–10.2)
CHLORIDE SERPL-SCNC: 105 MMOL/L (ref 96–108)
CHOLEST SERPL-MCNC: 210 MG/DL
CO2 SERPL-SCNC: 25 MMOL/L (ref 21–32)
CREAT SERPL-MCNC: 0.71 MG/DL (ref 0.6–1.3)
DOP CALC AO PEAK VEL: 0.9 M/S
DOP CALC AO VTI: 17.24 CM
DOP CALC LVOT AREA: 3.8 CM2
DOP CALC LVOT CARDIAC INDEX: 2.09 L/MIN/M2
DOP CALC LVOT CARDIAC OUTPUT: 4.11 L/MIN
DOP CALC LVOT DIAMETER: 2.2 CM
DOP CALC LVOT PEAK VEL VTI: 15.31 CM
DOP CALC LVOT PEAK VEL: 0.81 M/S
DOP CALC LVOT STROKE INDEX: 28.4 ML/M2
DOP CALC LVOT STROKE VOLUME: 58.17
E WAVE DECELERATION TIME: 225 MS
E/A RATIO: 1.18
EOSINOPHIL # BLD AUTO: 0.22 THOUSAND/ÂΜL (ref 0–0.61)
EOSINOPHIL NFR BLD AUTO: 3 % (ref 0–6)
ERYTHROCYTE [DISTWIDTH] IN BLOOD BY AUTOMATED COUNT: 12.6 % (ref 11.6–15.1)
EST. AVERAGE GLUCOSE BLD GHB EST-MCNC: 100 MG/DL
ETHANOL SERPL-MCNC: <10 MG/DL
GFR SERPL CREATININE-BSD FRML MDRD: 117 ML/MIN/1.73SQ M
GLUCOSE SERPL-MCNC: 78 MG/DL (ref 65–140)
HBA1C MFR BLD: 5.1 %
HCT VFR BLD AUTO: 43.8 % (ref 36.5–49.3)
HDLC SERPL-MCNC: 38 MG/DL
HGB BLD-MCNC: 14.7 G/DL (ref 12–17)
IMM GRANULOCYTES # BLD AUTO: 0.01 THOUSAND/UL (ref 0–0.2)
IMM GRANULOCYTES NFR BLD AUTO: 0 % (ref 0–2)
LAAS-AP2: 14.2 CM2
LAAS-AP4: 12.9 CM2
LDLC SERPL CALC-MCNC: 113 MG/DL (ref 0–100)
LEFT ATRIUM VOLUME (MOD BIPLANE): 32 ML
LEFT ATRIUM VOLUME INDEX (MOD BIPLANE): 16.2 ML/M2
LYMPHOCYTES # BLD AUTO: 2.45 THOUSANDS/ÂΜL (ref 0.6–4.47)
LYMPHOCYTES NFR BLD AUTO: 30 % (ref 14–44)
MAGNESIUM SERPL-MCNC: 2.2 MG/DL (ref 1.9–2.7)
MCH RBC QN AUTO: 30.4 PG (ref 26.8–34.3)
MCHC RBC AUTO-ENTMCNC: 33.6 G/DL (ref 31.4–37.4)
MCV RBC AUTO: 91 FL (ref 82–98)
MONOCYTES # BLD AUTO: 0.51 THOUSAND/ÂΜL (ref 0.17–1.22)
MONOCYTES NFR BLD AUTO: 6 % (ref 4–12)
MV E'TISSUE VEL-LAT: 15 CM/S
MV E'TISSUE VEL-SEP: 12 CM/S
MV PEAK A VEL: 0.57 M/S
MV PEAK E VEL: 67 CM/S
MV STENOSIS PRESSURE HALF TIME: 65 MS
MV VALVE AREA P 1/2 METHOD: 3.38
NEUTROPHILS # BLD AUTO: 4.92 THOUSANDS/ÂΜL (ref 1.85–7.62)
NEUTS SEG NFR BLD AUTO: 60 % (ref 43–75)
NRBC BLD AUTO-RTO: 0 /100 WBCS
PHOSPHATE SERPL-MCNC: 4.1 MG/DL (ref 2.7–4.5)
PLATELET # BLD AUTO: 249 THOUSANDS/UL (ref 149–390)
PMV BLD AUTO: 8.9 FL (ref 8.9–12.7)
POTASSIUM SERPL-SCNC: 4.3 MMOL/L (ref 3.5–5.3)
PROT SERPL-MCNC: 6.7 G/DL (ref 6.4–8.4)
RBC # BLD AUTO: 4.84 MILLION/UL (ref 3.88–5.62)
RIGHT ATRIAL 2D VOLUME: 26 ML
RIGHT ATRIUM AREA SYSTOLE A4C: 11.7 CM2
RIGHT VENTRICLE ID DIMENSION: 3 CM
SL CV LEFT ATRIUM LENGTH A2C: 5 CM
SODIUM SERPL-SCNC: 141 MMOL/L (ref 135–147)
TRICUSPID ANNULAR PLANE SYSTOLIC EXCURSION: 2.7 CM
TRIGL SERPL-MCNC: 297 MG/DL
TSH SERPL DL<=0.05 MIU/L-ACNC: 0.96 UIU/ML (ref 0.45–4.5)
VIT B12 SERPL-MCNC: 228 PG/ML (ref 180–914)
WBC # BLD AUTO: 8.17 THOUSAND/UL (ref 4.31–10.16)

## 2024-05-22 PROCEDURE — 99223 1ST HOSP IP/OBS HIGH 75: CPT | Performed by: STUDENT IN AN ORGANIZED HEALTH CARE EDUCATION/TRAINING PROGRAM

## 2024-05-22 PROCEDURE — 84100 ASSAY OF PHOSPHORUS: CPT

## 2024-05-22 PROCEDURE — 82330 ASSAY OF CALCIUM: CPT

## 2024-05-22 PROCEDURE — 82607 VITAMIN B-12: CPT

## 2024-05-22 PROCEDURE — 83735 ASSAY OF MAGNESIUM: CPT

## 2024-05-22 PROCEDURE — 70450 CT HEAD/BRAIN W/O DYE: CPT

## 2024-05-22 PROCEDURE — 85306 CLOT INHIBIT PROT S FREE: CPT | Performed by: PHYSICIAN ASSISTANT

## 2024-05-22 PROCEDURE — 84443 ASSAY THYROID STIM HORMONE: CPT

## 2024-05-22 PROCEDURE — 97167 OT EVAL HIGH COMPLEX 60 MIN: CPT

## 2024-05-22 PROCEDURE — 97163 PT EVAL HIGH COMPLEX 45 MIN: CPT

## 2024-05-22 PROCEDURE — 85732 THROMBOPLASTIN TIME PARTIAL: CPT | Performed by: PHYSICIAN ASSISTANT

## 2024-05-22 PROCEDURE — 86146 BETA-2 GLYCOPROTEIN ANTIBODY: CPT | Performed by: PHYSICIAN ASSISTANT

## 2024-05-22 PROCEDURE — 85670 THROMBIN TIME PLASMA: CPT | Performed by: PHYSICIAN ASSISTANT

## 2024-05-22 PROCEDURE — 85705 THROMBOPLASTIN INHIBITION: CPT | Performed by: PHYSICIAN ASSISTANT

## 2024-05-22 PROCEDURE — 80061 LIPID PANEL: CPT

## 2024-05-22 PROCEDURE — 80053 COMPREHEN METABOLIC PANEL: CPT

## 2024-05-22 PROCEDURE — 85300 ANTITHROMBIN III ACTIVITY: CPT | Performed by: PHYSICIAN ASSISTANT

## 2024-05-22 PROCEDURE — 93306 TTE W/DOPPLER COMPLETE: CPT

## 2024-05-22 PROCEDURE — 85613 RUSSELL VIPER VENOM DILUTED: CPT | Performed by: PHYSICIAN ASSISTANT

## 2024-05-22 PROCEDURE — 99223 1ST HOSP IP/OBS HIGH 75: CPT | Performed by: INTERNAL MEDICINE

## 2024-05-22 PROCEDURE — 83036 HEMOGLOBIN GLYCOSYLATED A1C: CPT

## 2024-05-22 PROCEDURE — 86147 CARDIOLIPIN ANTIBODY EA IG: CPT | Performed by: PHYSICIAN ASSISTANT

## 2024-05-22 PROCEDURE — NC001 PR NO CHARGE

## 2024-05-22 PROCEDURE — 93306 TTE W/DOPPLER COMPLETE: CPT | Performed by: INTERNAL MEDICINE

## 2024-05-22 PROCEDURE — 85305 CLOT INHIBIT PROT S TOTAL: CPT | Performed by: PHYSICIAN ASSISTANT

## 2024-05-22 PROCEDURE — 85303 CLOT INHIBIT PROT C ACTIVITY: CPT | Performed by: PHYSICIAN ASSISTANT

## 2024-05-22 PROCEDURE — 85025 COMPLETE CBC W/AUTO DIFF WBC: CPT

## 2024-05-22 PROCEDURE — 82077 ASSAY SPEC XCP UR&BREATH IA: CPT

## 2024-05-22 RX ORDER — PRIMIDONE 50 MG/1
200 TABLET ORAL DAILY
Status: DISCONTINUED | OUTPATIENT
Start: 2024-05-22 | End: 2024-05-23 | Stop reason: HOSPADM

## 2024-05-22 RX ORDER — ESCITALOPRAM OXALATE 10 MG/1
5 TABLET ORAL DAILY
Status: DISCONTINUED | OUTPATIENT
Start: 2024-05-22 | End: 2024-05-23 | Stop reason: HOSPADM

## 2024-05-22 RX ORDER — ACETAMINOPHEN 325 MG/1
975 TABLET ORAL EVERY 6 HOURS PRN
Status: DISCONTINUED | OUTPATIENT
Start: 2024-05-22 | End: 2024-05-23 | Stop reason: HOSPADM

## 2024-05-22 RX ORDER — LANOLIN ALCOHOL/MO/W.PET/CERES
100 CREAM (GRAM) TOPICAL DAILY
Status: DISCONTINUED | OUTPATIENT
Start: 2024-05-22 | End: 2024-05-23 | Stop reason: HOSPADM

## 2024-05-22 RX ORDER — POLYETHYLENE GLYCOL 3350 17 G/17G
17 POWDER, FOR SOLUTION ORAL DAILY PRN
Status: DISCONTINUED | OUTPATIENT
Start: 2024-05-22 | End: 2024-05-23 | Stop reason: HOSPADM

## 2024-05-22 RX ORDER — FOLIC ACID 1 MG/1
1 TABLET ORAL DAILY
Status: DISCONTINUED | OUTPATIENT
Start: 2024-05-22 | End: 2024-05-23 | Stop reason: HOSPADM

## 2024-05-22 RX ORDER — CALCIUM CARBONATE 500 MG/1
500 TABLET, CHEWABLE ORAL 3 TIMES DAILY PRN
Status: DISCONTINUED | OUTPATIENT
Start: 2024-05-22 | End: 2024-05-23 | Stop reason: HOSPADM

## 2024-05-22 RX ORDER — PROPRANOLOL HCL 60 MG
120 CAPSULE, EXTENDED RELEASE 24HR ORAL DAILY
Status: DISCONTINUED | OUTPATIENT
Start: 2024-05-22 | End: 2024-05-23 | Stop reason: HOSPADM

## 2024-05-22 RX ORDER — ATORVASTATIN CALCIUM 40 MG/1
40 TABLET, FILM COATED ORAL EVERY EVENING
Status: DISCONTINUED | OUTPATIENT
Start: 2024-05-22 | End: 2024-05-23 | Stop reason: HOSPADM

## 2024-05-22 RX ORDER — CHLORHEXIDINE GLUCONATE ORAL RINSE 1.2 MG/ML
15 SOLUTION DENTAL EVERY 12 HOURS SCHEDULED
Status: DISCONTINUED | OUTPATIENT
Start: 2024-05-22 | End: 2024-05-22

## 2024-05-22 RX ORDER — HYDRALAZINE HYDROCHLORIDE 20 MG/ML
5 INJECTION INTRAMUSCULAR; INTRAVENOUS EVERY 6 HOURS PRN
Status: DISCONTINUED | OUTPATIENT
Start: 2024-05-22 | End: 2024-05-23 | Stop reason: HOSPADM

## 2024-05-22 RX ORDER — ACETAMINOPHEN 325 MG/1
975 TABLET ORAL EVERY 6 HOURS PRN
Status: DISCONTINUED | OUTPATIENT
Start: 2024-05-22 | End: 2024-05-22

## 2024-05-22 RX ADMIN — ESCITALOPRAM OXALATE 5 MG: 10 TABLET ORAL at 08:26

## 2024-05-22 RX ADMIN — ATORVASTATIN CALCIUM 40 MG: 40 TABLET, FILM COATED ORAL at 17:22

## 2024-05-22 RX ADMIN — CHLORHEXIDINE GLUCONATE 15 ML: 1.2 RINSE ORAL at 08:26

## 2024-05-22 RX ADMIN — PRIMIDONE 200 MG: 50 TABLET ORAL at 08:27

## 2024-05-22 RX ADMIN — CHLORHEXIDINE GLUCONATE 15 ML: 1.2 RINSE ORAL at 01:56

## 2024-05-22 RX ADMIN — MULTIPLE VITAMINS W/ MINERALS TAB 1 TABLET: TAB ORAL at 08:26

## 2024-05-22 RX ADMIN — ACETAMINOPHEN 975 MG: 325 TABLET, FILM COATED ORAL at 04:12

## 2024-05-22 RX ADMIN — THIAMINE HCL TAB 100 MG 100 MG: 100 TAB at 08:26

## 2024-05-22 RX ADMIN — ACETAMINOPHEN 975 MG: 325 TABLET, FILM COATED ORAL at 12:34

## 2024-05-22 RX ADMIN — CHLORHEXIDINE GLUCONATE 15 ML: 1.2 RINSE ORAL at 21:34

## 2024-05-22 RX ADMIN — FOLIC ACID 1 MG: 1 TABLET ORAL at 08:26

## 2024-05-22 NOTE — EMTALA/ACUTE CARE TRANSFER
[facility] EMERGENCY DEPARTMENT  [address] [address]  Dept: [phone]      EMTALA TRANSFER CONSENT    NAME Phani Adkins                                         1985                              MRN [id number]    I have been informed of my rights regarding examination, treatment, and transfer   by Dr. Darnell Leigh MD    Benefits: Specialized equipment and/or services available at the receiving facility (Include comment)________________________ (ICU)    Risks: Potential for delay in receiving treatment, Potential deterioration of medical condition, Increased discomfort during transfer, Loss of IV, Possible worsening of condition or death during transfer      Consent for Transfer:  I acknowledge that my medical condition has been evaluated and explained to me by the emergency department physician or other qualified medical person and/or my attending physician, who has recommended that I be transferred to the service of  Accepting Physician: Partha at Accepting Facility Name, City & State : [place]. The above potential benefits of such transfer, the potential risks associated with such transfer, and the probable risks of not being transferred have been explained to me, and I fully understand them.  The doctor has explained that, in my case, the benefits of transfer outweigh the risks.  I agree to be transferred.    I authorize the performance of emergency medical procedures and treatments upon me in both transit and upon arrival at the receiving facility.  Additionally, I authorize the release of any and all medical records to the receiving facility and request they be transported with me, if possible.  I understand that the safest mode of transportation during a medical emergency is an ambulance and that the Hospital advocates the use of this mode of transport. Risks of traveling to the receiving facility by car, including absence of medical control, life  sustaining equipment, such as oxygen, and medical personnel has been explained to me and I fully understand them.    (RAHLU CORRECT BOX BELOW)  [  ]  I consent to the stated transfer and to be transported by ambulance/helicopter.  [  ]  I consent to the stated transfer, but refuse transportation by ambulance and accept full responsibility for my transportation by car.  I understand the risks of non-ambulance transfers and I exonerate the Hospital and its staff from any deterioration in my condition that results from this refusal.    X___________________________________________    DATE  24  TIME________  Signature of patient or legally responsible individual signing on patient behalf           RELATIONSHIP TO PATIENT_________________________          Provider Certification    [facility] 1985                              MRN [id number]    A medical screening exam was performed on the above named patient.  Based on the examination:    Condition Necessitating Transfer The encounter diagnosis was Stroke-like symptoms.    Patient Condition: The patient has been stabilized such that within reasonable medical probability, no material deterioration of the patient condition or the condition of the unborn child(luis) is likely to result from the transfer    Reason for Transfer: Level of Care needed not available at this facility    Transfer Requirements: Facility [place]   Space available and qualified personnel available for treatment as acknowledged by [phone]  Agreed to accept transfer and to provide appropriate medical treatment as acknowledged by       Partha  Appropriate medical records of the examination and treatment of the patient are provided at the time of transfer   STAFF INITIAL WHEN COMPLETED _______  Transfer will be performed by qualified personnel from [place]  and appropriate transfer equipment as required, including the use of necessary and  appropriate life support measures.    Provider Certification: I have examined the patient and explained the following risks and benefits of being transferred/refusing transfer to the patient/family:  General risk, such as traffic hazards, adverse weather conditions, rough terrain or turbulence, possible failure of equipment (including vehicle or aircraft), or consequences of actions of persons outside the control of the transport personnel, Unanticipated needs of medical equipment and personnel during transport, Risk of worsening condition, The possibility of a transport vehicle being unavailable      Based on these reasonable risks and benefits to the patient and/or the unborn child(luis), and based upon the information available at the time of the patient’s examination, I certify that the medical benefits reasonably to be expected from the provision of appropriate medical treatments at another medical facility outweigh the increasing risks, if any, to the individual’s medical condition, and in the case of labor to the unborn child, from effecting the transfer.    X____________________________________________ DATE 05/21/24        TIME_______      ORIGINAL - SEND TO MEDICAL RECORDS   COPY - SEND WITH PATIENT DURING TRANSFER

## 2024-05-22 NOTE — ASSESSMENT & PLAN NOTE
39 y.o. male with essential tremor s/p bifrontal neurostimulator placement, cervical dystonia, ALEKSANDRA, tobacco and alcohol abuse who presented to [facility] as a stroke alert with symptoms of dysarthria, right face, arm and leg weakness presenting upon waking from a nap. LKW 5/21 at 1815. NIHSS 4.  Initial neuroimaging negative.  TNK given.     Work-up:  Repeat CTH 24 hours post TNK negative for acute intracranial abnormality.  Artifact from bilateral DBS leads noted.  Initial CTH negative for acute abnormality.  CTA H/N negative for large vessel occlusion or flow limiting stenosis.  Total cholesterol 210, Triglycerides 297, HDL 38,   A1C 5.1  TSH WNL  Echo: Technically difficult. EF 57%, normal sized atria bilaterally, intact interatrial septum with no apparent shunting    Per most recent Neurology note from January 2024, patient's left DBS lead is not MRI compatible due to impedance.  No acute stroke noted on repeat head CT; however, small stroke may not be apparent on CT and there is also significant artifact from his DBS leads which could obscure ischemic change.  Will treat as TIA/stroke.    Plan:  Acute ischemic stroke protocol  TNK protocol - TNK given 5/21 at 2035  Initiate Aspirin 81mg and continue indefinitely  Atorvastatin 40mg  Plan for outpatient cardiology follow-up for Zio patch/Loop recorder  Telemetry  Thrombosis panel results pending  Secondary stroke risk factor modification  Goal normotension  Goal of euglycemia and normothermia  PT/OT/ST  Stroke Education  Strongly advised smoking cessation  Patient to follow-up with his regular neurology team at [facility] upon discharge.  Advised he should call to make an appointment within the next several weeks.

## 2024-05-22 NOTE — LETTER
[facility]  [address] [address]  Dept: [phone]    May 23, 2024     Patient: Phani Adkins   YOB: 1985   Date of Visit: 5/22/2024       To Whom it May Concern:    Phani Adkins is under my professional care. He was seen in the hospital from 5/22/2024 to 05/23/24. He may return to work on 6/5/2024  without limitations.    If you have any questions or concerns, please don't hesitate to call.         Sincerely,      //Digital Signature//    Javier Rubalcava DO

## 2024-05-22 NOTE — PLAN OF CARE
Problem: Prexisting or High Potential for Compromised Skin Integrity  Goal: Skin integrity is maintained or improved  Description: INTERVENTIONS:  - Identify patients at risk for skin breakdown  - Assess and monitor skin integrity  - Assess and monitor nutrition and hydration status  - Monitor labs   - Assess for incontinence   - Turn and reposition patient  - Assist with mobility/ambulation  - Relieve pressure over bony prominences  - Avoid friction and shearing  - Provide appropriate hygiene as needed including keeping skin clean and dry  - Evaluate need for skin moisturizer/barrier cream  - Collaborate with interdisciplinary team   - Patient/family teaching  - Consider wound care consult   5/22/2024 0040 by Waleska Rosales RN  Outcome: Progressing  5/22/2024 0039 by Waleska Rosales RN  Outcome: Progressing     Problem: NEUROSENSORY - ADULT  Goal: Achieves stable or improved neurological status  Description: INTERVENTIONS  - Monitor and report changes in neurological status  - Monitor vital signs such as temperature, blood pressure, glucose, and any other labs ordered   - Initiate measures to prevent increased intracranial pressure  - Monitor for seizure activity and implement precautions if appropriate      5/22/2024 0040 by Waleska Rosales RN  Outcome: Progressing  5/22/2024 0039 by Waleska Rosales RN  Outcome: Progressing  Goal: Remains free of injury related to seizures activity  Description: INTERVENTIONS  - Maintain airway, patient safety  and administer oxygen as ordered  - Monitor patient for seizure activity, document and report duration and description of seizure to physician/advanced practitioner  - If seizure occurs,  ensure patient safety during seizure  - Reorient patient post seizure  - Seizure pads on all 4 side rails  - Instruct patient/family to notify RN of any seizure activity including if an aura is experienced  - Instruct patient/family to call for assistance with activity based on nursing  assessment  - Administer anti-seizure medications if ordered    5/22/2024 0040 by Waleska Rosales RN  Outcome: Progressing  5/22/2024 0039 by Waleska Rosales RN  Outcome: Progressing  Goal: Achieves maximal functionality and self care  Description: INTERVENTIONS  - Monitor swallowing and airway patency with patient fatigue and changes in neurological status  - Encourage and assist patient to increase activity and self care.   - Encourage visually impaired, hearing impaired and aphasic patients to use assistive/communication devices  5/22/2024 0040 by Waleska Rosales RN  Outcome: Progressing  5/22/2024 0039 by Waleska Rosales RN  Outcome: Progressing     Problem: METABOLIC, FLUID AND ELECTROLYTES - ADULT  Goal: Electrolytes maintained within normal limits  Description: INTERVENTIONS:  - Monitor labs and assess patient for signs and symptoms of electrolyte imbalances  - Administer electrolyte replacement as ordered  - Monitor response to electrolyte replacements, including repeat lab results as appropriate  - Instruct patient on fluid and nutrition as appropriate  5/22/2024 0040 by Waleska Rosales RN  Outcome: Progressing  5/22/2024 0039 by Waleska Rosales RN  Outcome: Progressing  Goal: Fluid balance maintained  Description: INTERVENTIONS:  - Monitor labs   - Monitor I/O and WT  - Instruct patient on fluid and nutrition as appropriate  - Assess for signs & symptoms of volume excess or deficit  5/22/2024 0040 by Waleska Rosales RN  Outcome: Progressing  5/22/2024 0039 by Waleska Rosales RN  Outcome: Progressing  Goal: Glucose maintained within target range  Description: INTERVENTIONS:  - Monitor Blood Glucose as ordered  - Assess for signs and symptoms of hyperglycemia and hypoglycemia  - Administer ordered medications to maintain glucose within target range  - Assess nutritional intake and initiate nutrition service referral as needed  5/22/2024 0040 by Waleska Rosales RN  Outcome: Progressing  5/22/2024 0039 by  Waleska Rosales RN  Outcome: Progressing     Problem: MUSCULOSKELETAL - ADULT  Goal: Maintain or return mobility to safest level of function  Description: INTERVENTIONS:  - Assess patient's ability to carry out ADLs; assess patient's baseline for ADL function and identify physical deficits which impact ability to perform ADLs (bathing, care of mouth/teeth, toileting, grooming, dressing, etc.)  - Assess/evaluate cause of self-care deficits   - Assess range of motion  - Assess patient's mobility  - Assess patient's need for assistive devices and provide as appropriate  - Encourage maximum independence but intervene and supervise when necessary  - Involve family in performance of ADLs  - Assess for home care needs following discharge   - Consider OT consult to assist with ADL evaluation and planning for discharge  - Provide patient education as appropriate  5/22/2024 0040 by Waleska Rosales RN  Outcome: Progressing  5/22/2024 0039 by Waleska Rosales RN  Outcome: Progressing  Goal: Maintain proper alignment of affected body part  Description: INTERVENTIONS:  - Support, maintain and protect limb and body alignment  - Provide patient/ family with appropriate education  5/22/2024 0040 by Waleska Rosales RN  Outcome: Progressing  5/22/2024 0039 by Waleska Rosales RN  Outcome: Progressing     Problem: SAFETY ADULT  Goal: Patient will remain free of falls  Description: INTERVENTIONS:  - Educate patient/family on patient safety including physical limitations  - Instruct patient to call for assistance with activity   - Consult OT/PT to assist with strengthening/mobility   - Keep Call bell within reach  - Keep bed low and locked with side rails adjusted as appropriate  - Keep care items and personal belongings within reach  - Initiate and maintain comfort rounds  - Make Fall Risk Sign visible to staff  - Offer Toileting every 2 Hours, in advance of need  - Initiate/Maintain bed alarm  - Obtain necessary fall risk management  equipment  - Apply yellow socks and bracelet for high fall risk patients  - Consider moving patient to room near nurses station  Outcome: Progressing  Goal: Maintain or return to baseline ADL function  Description: INTERVENTIONS:  -  Assess patient's ability to carry out ADLs; assess patient's baseline for ADL function and identify physical deficits which impact ability to perform ADLs (bathing, care of mouth/teeth, toileting, grooming, dressing, etc.)  - Assess/evaluate cause of self-care deficits   - Assess range of motion  - Assess patient's mobility; develop plan if impaired  - Assess patient's need for assistive devices and provide as appropriate  - Encourage maximum independence but intervene and supervise when necessary  - Involve family in performance of ADLs  - Assess for home care needs following discharge   - Consider OT consult to assist with ADL evaluation and planning for discharge  - Provide patient education as appropriate  Outcome: Progressing  Goal: Maintains/Returns to pre admission functional level  Description: INTERVENTIONS:  - Perform AM-PAC 6 Click Basic Mobility/ Daily Activity assessment daily.  - Set and communicate daily mobility goal to care team and patient/family/caregiver.   - Collaborate with rehabilitation services on mobility goals if consulted  - Perform Range of Motion 3 times a day.  - Reposition patient every 3 hours.  - Dangle patient 3 times a day  - Stand patient 3 times a day  - Ambulate patient 3 times a day  - Out of bed to chair 3 times a day   - Out of bed for meals 3 times a day  - Out of bed for toileting  - Record patient progress and toleration of activity level   Outcome: Progressing     Problem: DISCHARGE PLANNING  Goal: Discharge to home or other facility with appropriate resources  Description: INTERVENTIONS:  - Identify barriers to discharge w/patient and caregiver  - Arrange for needed discharge resources and transportation as appropriate  - Identify discharge  learning needs (meds, wound care, etc.)  - Arrange for interpretive services to assist at discharge as needed  - Refer to Case Management Department for coordinating discharge planning if the patient needs post-hospital services based on physician/advanced practitioner order or complex needs related to functional status, cognitive ability, or social support system  Outcome: Progressing     Problem: Knowledge Deficit  Goal: Patient/family/caregiver demonstrates understanding of disease process, treatment plan, medications, and discharge instructions  Description: Complete learning assessment and assess knowledge base.  Interventions:  - Provide teaching at level of understanding  - Provide teaching via preferred learning methods  Outcome: Progressing

## 2024-05-22 NOTE — QUICK NOTE
Family and patient updated at bedside with medical management and treatment plan.  All questions answered.    Of note, patient's DBS medical card uploaded.

## 2024-05-22 NOTE — OCCUPATIONAL THERAPY NOTE
Occupational Therapy Evaluation     Patient Name: Phani Adkins  Today's Date: 5/22/2024  Problem List  Principal Problem:    Stroke-like symptoms  Active Problems:    Tremor    Generalized anxiety disorder    Tobacco user    Cervical dystonia    Alcohol abuse    Past Medical History  Past Medical History:   Diagnosis Date    Herpes zoster     Tinea corporis      Past Surgical History  Past Surgical History:   Procedure Laterality Date    BRAIN SURGERY      INSERTION / PLACEMENT / REVISION NEUROSTIMULATOR  01/2016    with LED replacement 1/2017    KNEE SURGERY Left     TOOTH EXTRACTION             05/22/24 1050   OT Last Visit   OT Visit Date 05/22/24   Note Type   Note type Evaluation   Additional Comments greeted seated up in chair and agreeable to skilled OT evaluation. family at bedside.   Pain Assessment   Pain Assessment Tool [place] FACES   Pain Score 3   Pain Location/Orientation Location: Head   Restrictions/Precautions   Weight Bearing Precautions Per Order No   Other Precautions Multiple lines;Telemetry;Fall Risk;Pain   Home Living   Type of Home House   Home Layout Multi-level;Laundry in basement;1/2 bath on main level;Bed/bath upstairs;Stairs to enter with rails  (3STE back door vs 5 JAIME front  with 1 rail.)   Bathroom Shower/Tub Tub/shower unit   Bathroom Toilet Standard   Home Equipment   (no DME)   Prior Function   Level of [place] Independent with ADLs;Independent with functional mobility;Independent with IADLS   Lives With Significant other   Receives Help From Family   IADLs Independent with driving;Independent with meal prep;Independent with medication management   Falls in the last 6 months 1 to 4  (1 reported)   Vocational Full time employment   Comments Prior to admission, pt lives with GF in a 2 level home with 3vs 5 JAIME with rails. 1 FOS to 2nd floor bedroom / bathroom. Has a 1/2 bath on first floor. I with ADL, IADLS and mobility with no device PTA. Drives. Works full time. 1  fall reported- slipped on bottom step to basement.   Lifestyle   Reciprocal Relationships GF, mother   Service to Others works FT   ADL   Where Assessed Chair   Eating Assistance 5  Supervision/Setup   Grooming Assistance 5  Supervision/Setup   UB Bathing Assistance 5  Supervision/Setup   LB Bathing Assistance 5  Supervision/Setup   UB Dressing Assistance 5  Supervision/Setup   LB Dressing Assistance 5  Supervision/Setup   Toileting Assistance  5  Supervision/Setup   Bed Mobility   Supine to Sit Unable to assess   Additional Comments greeted seated up in chair and left seated in chair following session. call light in reach.   Transfers   Sit to Stand 5  Supervision   Additional items Increased time required;Verbal cues   Stand to Sit 5  Supervision   Additional items Increased time required;Verbal cues   Additional Comments cues for safety, hand placement and best tech   Functional Mobility   Functional Mobility 5  Supervision  (SBA)   Additional Comments no device vs RW. Household distances   Balance   Static Sitting Good   Dynamic Sitting Fair +   Static Standing Fair   Dynamic Standing Fair -   Ambulatory Poor +   Activity Tolerance   Activity Tolerance Patient tolerated treatment well   Medical Staff Made Aware PT Yamil   Nurse Made Aware WILLIE KIRKPATRICK Assessment   RUE Assessment WFL  (noted slight weakness in RUE at shoulder MMT 4+/5.  strength MMT 4/5.remains functional)   LUE Assessment   LUE Assessment WFL   Hand Function   Gross Motor Coordination Functional   Fine Motor Coordination Functional   Vision-Basic Assessment   Current Vision Wears glasses all the time   Psychosocial   Psychosocial (WDL) WDL   Cognition   Overall Cognitive Status WFL   Arousal/Participation Cooperative   Attention Within functional limits   Orientation Level Oriented X4   Memory Within functional limits   Following Commands Follows all commands and directions without difficulty   Comments pleasant and cooperative    Assessment   Limitation Decreased ADL status;Decreased UE strength;Decreased Safe judgement during ADL;Decreased endurance;Decreased self-care trans;Decreased high-level ADLs   Prognosis Good   Assessment Phani Adkins is a 39 y.o. male seen for OT evaluation s/p admit to [facility] on 5/22/2024 w/ Stroke-like symptoms.  Comorbidities affecting pt's functional performance at time of assessment include:   essential tremor s/p bifrontal neurostimulator placement, cervical dystonia, ALEKSANDRA, tobacco and alcohol abuse . Pt with active OT orders and activity orders for Up and OOB as tolerated. Personal factors affecting pt at time of IE include:JAIME home environment, steps within home environment, difficulty performing ADLs, difficulty performing IADLs, and difficulty performing transfers/mobility. Prior to admission, pt lives with GF in a 2 level home with 3vs 5 JAIME with rails. 1 FOS to 2nd floor bedroom / bathroom. Has a 1/2 bath on first floor. I with ADL, IADLS and mobility with no device PTA. Drives. Works full time. 1 fall reported- slipped on bottom step to basement. Upon evaluation: Pt currently requires supervision  for UB ADLs, supervision  for LB ADLs, supervision for toileting, unable to assess for bed mobility, supervision  for functional transfers, and close supervision with nodevice vs cane vs RW mobility 2* the following deficits impacting occupational performance:weakness. VITALS during session: seated 120/83> standing 137/ 86> post mobility 131/94. HR 88- 94. O2 WNL on RA. Pt to benefit from continued skilled OT tx while in the hospital to address deficits as defined above and maximize level of functional independence w ADL's and functional mobility. Occupational Performance areas to address include: grooming, bathing/shower, toilet hygiene, dressing, health maintenance, functional mobility, and clothing management. From OT standpoint, recommendation at time of d/c would be level 3 resources (HH vs OP). OT to  follow pt on caseload 2-3x/wk.   Goals   Patient Goals to go home.   STG Time Frame 3-5   Short Term Goal #1 Pt will improve activity tolerance to G for min 30 min txment sessions for increase engagement in functional tasks   Short Term Goal #2 Pt will complete LB dressing/self care w/ mod I using adaptive device and DME as needed   Short Term Goal  Pt will complete toileting w/ mod I w/ G hygiene/thoroughness using DME as needed   LTG Time Frame 10-14   Long Term Goal #1 Pt will improve functional transfers to Mod I on/off all surfaces using DME as needed w/ G balance/safety   Long Term Goal #2 Pt will improve functional mobility during ADL/IADL/leisure tasks to Mod I using DME as needed w/ G balance/safety   Long Term Goal Pt will demonstrate G carryover of pt/caregiver education and training as appropriate w/o cues w/ good tolerance to increase safety during functional tasks   Plan   Treatment Interventions ADL retraining;Functional transfer training;UE strengthening/ROM;Endurance training;Patient/family training;Equipment evaluation/education;Neuromuscular reeducation;Compensatory technique education;Energy conservation;Activityengagement   Goal Expiration Date 06/04/24   OT Treatment Day 0   OT Frequency 3-5x/wk   Discharge Recommendation   Rehab Resource Intensity Level, OT III (Minimum Resource Intensity)   Additional Comments  The patient's raw score on the AM-PAC Daily Activity Inpatient Short Form is 20. A raw score of greater than or equal to 19 suggests the patient may benefit from discharge to home. Please refer to the recommendation of the Occupational Therapist for safe discharge planning.   AM-PAC Daily Activity Inpatient   Lower Body Dressing 3   Bathing 3   Toileting 3   Upper Body Dressing 3   Grooming 4   Eating 4   Daily Activity Raw Score 20   Daily Activity Standardized Score (Calc for Raw Score >=11) 42.03   AM-[facility] Applied Cognition Inpatient   Following a Speech/Presentation 4    Understanding Ordinary Conversation 4   Taking Medications 4   Remembering Where Things Are Placed or Put Away 4   Remembering List of 4-5 Errands 4   Taking Care of Complicated Tasks 4   Applied Cognition Raw Score 24   Applied Cognition Standardized Score 62.21   Coty Friend, OT

## 2024-05-22 NOTE — PLAN OF CARE
Problem: PHYSICAL THERAPY ADULT  Goal: Performs mobility at highest level of function for planned discharge setting.  See evaluation for individualized goals.  Description: Treatment/Interventions: Functional transfer training, LE strengthening/ROM, Elevations, Therapeutic exercise, Patient/family training, Equipment eval/education, Bed mobility, Gait training, Compensatory technique education, Spoke to nursing, OT, Family  Equipment Recommended: Walker       See flowsheet documentation for full assessment, interventions and recommendations.  Note: Prognosis: Good  Problem List: Decreased strength, Impaired balance, Decreased mobility, Decreased coordination, Obesity, Impaired sensation  Assessment: Pt is a 39 y.o. 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 S/p TNK 5/21 at 8:30PM w/ improved sxs. Primary dx: Stroke-like symptoms. Significant pmhx per chart: essential tremor s/p bifrontal neurostimulator placement, cervical dystonia, ALEKSANDRA, tobacco and alcohol abuse. PT consulted to assess strength/functional mobility, activity tolerance and d/c needs. Active PT orders and activity orders for OOB to chair. PTA, pt reports functioning at I w/ functional mobility/transfers w/o AD, ADLs, IADLS, (+)FT work, (+) fall x1,. GF able to assist upon d/c as she works from home. During PT IE, pt presenting with above (see flowsheet) outlined functional impairments including dec strength, balance, gait, and deficits that limit functional mobility and activity tolerance relative to baseline. Pt currently requires supervision for transfers, ambulation for household distances, and CGA for elevations. Inc stability noted w/ use of RW vs. SPC/no AD. Recommend continued use of RW at this time. Pt currently demonstrating inc fall risk.  Fall risk education provided with good understanding. Education provided on  KENYON.E.F.A.S.T. acronym/proper use of AD. Denied additional sxs throughout session. Recommend continued mobilization of pt with nsg staff as tolerated to prevent further decline in function.  Pt will benefit from continued PT services to progress mobility independence necessary for return to PLOF. Based on pt presentation and impairments, pt would most appropriately benefit from d/c to home with level III (min) rehab intensity resources  and with increased supervision/assistance required to safely complete functional mobility and use of RW when medically appropriate.  Barriers to Discharge: None     Rehab Resource Intensity Level, PT: III (Minimum Resource Intensity)    See flowsheet documentation for full assessment.

## 2024-05-22 NOTE — ASSESSMENT & PLAN NOTE
Receives Botox injections- last Botox injection 1 mo ago  Continue primidone 200mg every morning   PRN tylenol

## 2024-05-22 NOTE — SPEECH THERAPY NOTE
Speech Language/Pathology  ICU CRNP reported order will be cancelled. Please reconsult if needed.

## 2024-05-22 NOTE — H&P
[facility]  H&P  Name: Phani Adkins 39 y.o. 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 Objective only notable for RLE weakness     Plan:  Admit to ICU for frequent neuro checks per TNK admin protocol  TNK protocol in place  Fall risk  Bed rest with only bathroom privileges   STAT CTH for any exam changes   Monitor uop and iv sites  Repeat CTH at 8:30pm on 5/22 (today)  Maintain SBP <180/105 with PRN hydralazine   Echo with bubble study  MRI  Hgb A1c, lipid panel f/u  Start atorvastatin 40mg   Dietician consult   PT/OT/PMR/SLP/CM consults   Monitor on tele  EKG- NSR   Prevent secondary injury- maintain normonatremia, normoglycemia, normothermia   Encourage OP smoking and alcohol cessation as well as healthy lifestyle   Appreciate neuro eval and recs    Cervical dystonia  Assessment & Plan  Receives Botox injections- last Botox injection 1 mo ago  Continue primidone 200mg every morning   PRN tylenol    Tobacco user  Assessment & Plan  Smokes 1/2 ppd x 20 years    Plan:  Strongly encourage OP cessation- counseling and education provided  Offered nicotine patch- patient refused     Generalized anxiety disorder  Assessment & Plan  Continue lexapro 5mg daily pta med    Tremor  Assessment & Plan  Stimulator in place for at least 5+ years     Plan:  Continue propanolol 120mg daily   Continue to monitor exam, tremors baseline for patient            History of Present Illness     HPI: Phani Akdins is a 39 y.o. male with PMH significant for nicotine and alcohol abuse, chronic tremors,  cervical dystonia who presents as a transfer from [place] for TNK s/p stroke like symptoms. Patient LKW 1815. He took a nap and then called wife when he woke up feeling different with notable R facial droop, dysarthria and weakness of RLE and RUE.  His wife called EMS at [facility] CTH/CTA H/N negative for ICH or LVO, TNK given at 8:35PM. Patient transferred to [facility] for monitoring.     Drinks 4 beers/week, smokes 1/2ppd for 20+ years. Works as a  in [place]. No prior episodes of stroke like symptoms, no hx of strokes, no personal or family hx of bleeding/clotting disorders.     History obtained from chart review and the patient.  Review of Systems   Constitutional:  Negative for chills and fever.   HENT:  Negative for ear pain and sore throat.    Eyes:  Negative for pain and visual disturbance.   Respiratory:  Negative for cough and shortness of breath.    Cardiovascular:  Negative for chest pain and palpitations.   Gastrointestinal:  Negative for abdominal pain and vomiting.   Genitourinary:  Negative for dysuria and hematuria.   Musculoskeletal:  Negative for arthralgias and back pain.   Skin:  Negative for color change and rash.   Neurological:  Positive for speech difficulty, weakness and headaches. Negative for seizures and syncope.        HA improving, 5/10 since arrival   All other systems reviewed and are negative.    Disposition: Critical care  Historical Information   Past Medical History:  No date: Herpes zoster  No date: Tinea corporis Past Surgical History:  No date: BRAIN SURGERY  01/2016: INSERTION / PLACEMENT / REVISION NEUROSTIMULATOR      Comment:  with LED replacement 1/2017  No date: KNEE SURGERY; Left  No date: TOOTH EXTRACTION   Current Outpatient Medications   Medication Instructions    escitalopram (LEXAPRO) 5 mg tablet No dose, route, or frequency recorded.    ibuprofen (MOTRIN) 600 mg, Oral, Every 6 hours PRN    primidone (MYSOLINE) 200 mg, Oral, Daily, 4 tablets daily.     propranolol (INDERAL LA) 120 mg 24 hr capsule No dose, route, or frequency recorded.    Xeomin 100 units SOLR No dose, route, or frequency recorded.    No Known Allergies   Social History     Tobacco Use    Smoking status: Every Day     Current packs/day: 0.50     Types: Cigarettes    Smokeless tobacco: Never   Vaping Use    Vaping status: Never Used   Substance Use Topics    Alcohol use: Yes     Alcohol/week: 3.0 standard drinks of alcohol     Types: 3 Cans of beer per week     Comment: socially    Drug use: Never    Family History   Problem Relation Age of Onset    No Known Problems Mother     Pancreatic cancer Maternal Grandmother     Diabetes Paternal Grandmother           Objective                            Vitals I/O      Most Recent Min/Max in 24hrs   Temp 98.5 °F (36.9 °C) Temp  Min: 98.5 °F (36.9 °C)  Max: 98.5 °F (36.9 °C)   Pulse 86 Pulse  Min: 74  Max: 86   Resp (!) 25 Resp  Min: 24  Max: 33   /64 BP  Min: 104/63  Max: 115/76   O2 Sat 97 % SpO2  Min: 95 %  Max: 97 %    No intake or output data in the 24 hours ending 05/22/24 0228    Diet Regular; Regular House    Invasive Monitoring           Physical Exam   Physical Exam  Eyes:      General: No dysconjugate gaze and no foreign body in eyeNo visual field deficit or scleral icterus.        Right eye: No discharge.         Left eye: No discharge.      Conjunctiva/sclera: Conjunctivae normal.      Pupils: Pupils are equal, round, and reactive to light.   Skin:     General: Skin is warm, dry and not mottled extremities.      Coloration: Skin is not jaundiced or pale.   HENT:      Head: Normocephalic and atraumatic.   Cardiovascular:      Rate and Rhythm: Normal rate and regular rhythm.      Pulses: Normal pulses.   Musculoskeletal:      Right lower leg: No edema.      Left lower leg: No edema.   Abdominal:      Palpations: Abdomen is soft.      Tenderness: There is no abdominal tenderness.   Pulmonary:      Effort: Pulmonary effort is normal.       Breath sounds: Normal breath sounds.   Neurological:      General: No focal deficit present.      Mental Status: He is alert and oriented to person, place, and time. Mental status is at baseline.      GCS: GCS eye subscore is 4. GCS verbal subscore is 5. GCS motor subscore is 6.      Cranial Nerves: Cranial nerves 2-12 are intact.      Motor: Weakness and tremor. No atrophy, abnormal muscle tone or seizure activity.      Coordination: Coordination is intact.      Gait: Gait is intact.      Comments: 4/5 RLE weakness  5/5 RUE, LUE, LLE strength  Sensation in tact head to toe   No word finding difficulties    Chronic LUE tremor and head tremor             Diagnostic Studies      [id number]   Imaging: NSR I have personally reviewed pertinent reports.   and I have personally reviewed pertinent films in PACS     Medications:  Scheduled PRN   atorvastatin, 40 mg, QPM  chlorhexidine, 15 mL, Q12H ZOHREH  escitalopram, 5 mg, Daily  primidone, 200 mg, Daily  propranolol, 120 mg, Daily      hydrALAZINE, 5 mg, Q6H PRN       Continuous          Labs:    CBC    Recent Labs     24   WBC 7.69   HGB 15.6   HCT 47.0        BMP    Recent Labs     24   SODIUM 136   K 3.9   CL 99   CO2 28   AGAP 9   BUN 6   CREATININE 0.84   CALCIUM 9.2       Coags    Recent Labs     24   INR 0.96   PTT 30        Additional Electrolytes  No recent results       Blood Gas    No recent results  No recent results LFTs  No recent results    Infectious  No recent results  Glucose  Recent Labs     24   GLUC 91             Anticipated Length of Stay is > 2 midnights  Clover Dickson PA-C

## 2024-05-22 NOTE — PHYSICAL THERAPY NOTE
PHYSICAL THERAPY EVALUATION     NAME:  Phani Adkins  DATE: 05/22/24    AGE:   39 y.o.  Mrn:   [id number]  ADMIT DX:  Stroke-like symptoms [R29.90]    Patient Active Problem List   Diagnosis    Tremor    Generalized anxiety disorder    Essential tremor    Tobacco user    Cervical dystonia    Seborrheic keratosis    Stroke (cerebrum) (HCC)    Stroke-like symptoms    Alcohol abuse       Past Medical History:   Diagnosis Date    Herpes zoster     Tinea corporis        Past Surgical History:   Procedure Laterality Date    BRAIN SURGERY      INSERTION / PLACEMENT / REVISION NEUROSTIMULATOR  01/2016    with LED replacement 1/2017    KNEE SURGERY Left     TOOTH EXTRACTION         Imaging Studies:  MRI Inpatient Order    (Results Pending)   CT head wo contrast    (Results Pending)       Past Medical History:   Diagnosis Date    Herpes zoster     Tinea corporis      Length Of Stay: 0  Performed at least 2 patient identifiers during session: Name and Birthday  PHYSICAL THERAPY EVALUATION :        05/22/24 1116   PT Last Visit   PT Visit Date 05/22/24   Note Type   Note type Evaluation   Additional Comments Greeted seated in bedside chair in care of OT and family.   Pain Assessment   Pain Assessment Tool 0-10   Pain Score 3   Pain Location/Orientation Location: Head   Restrictions/Precautions   Weight Bearing Precautions Per Order No   Other Precautions Multiple lines;Telemetry;Fall Risk;Pain   Home Living   Type of Home House   Home Layout Multi-level;Laundry in basement;1/2 bath on main level;Bed/bath upstairs;Stairs to enter with rails  (3STE back door vs 5 JAIME front with 1 rail., u/l rail up to bedroom)   Bathroom Shower/Tub Tub/shower unit   Bathroom Toilet Standard   Home Equipment   (denies)   Prior Function   Level of [place] Independent with ADLs;Independent with functional mobility;Independent with IADLS   Lives With Significant other   Receives Help From Family   IADLs Independent with driving;Independent with  "meal prep;Independent with medication management   Falls in the last 6 months 1 to 4  (x1)   Vocational Full time employment   Comments PTA, pt reports functioning at I w/ functional mobility/transfers w/o AD, ADLs, IADLS, (+)FT work, (+) fall x1,. GF able to assist upon d/c as she works from home.   Cognition   Overall Cognitive Status WFL   Arousal/Participation Alert   Attention Within functional limits   Orientation Level Oriented X4   Memory Within functional limits   Following Commands Follows all commands and directions without difficulty   Subjective   Subjective \"Using the cane makes my tremor come out more.\" Family reporting inc tremors since sxs onset.   RLE Assessment   RLE Assessment WFL  (Functional however, hip flx 3+/5, knee and ankle 4-/5)   LLE Assessment   LLE Assessment WFL  (Functional however, hip flx 4-/5, knee and ankle 4+/5)   Vision-Basic Assessment   Current Vision Wears glasses all the time   Coordination   Sensation X  (reporting paresthesias LE)   Heel to Hatch Impaired  (RLE non-fluid)   Light Touch   RLE Light Touch Grossly intact   LLE Light Touch Grossly intact   Proprioception   RLE Proprioception Grossly intact   LLE Proprioception Grossly Intact   Transfers   Sit to Stand 5  Supervision   Additional items Increased time required;Verbal cues   Stand to Sit 5  Supervision   Additional items Increased time required;Verbal cues  (RW)   Additional Comments Cues for technique/safety, hand placement.   Ambulation/Elevation   Gait pattern Improper Weight shift;Wide ALDAIR;Decreased foot clearance;Short stride;Excessively slow;Step through pattern  (excessively short, dec B/L UE arm swing)   Gait Assistance 5  Supervision   Additional items Verbal cues   Assistive Device None;SPC;Rolling walker   Distance 65'x1 no AD, stairs, 20'x1 SPC, 45'x1 RW   Stair Management Assistance   (CGA)   Additional items Verbal cues;Increased time required;Assist x 1   Stair Management Technique One rail " R  (ascend forward w/ non-reciprocal, descend initially forward unsteady progressing to sideways w/ 2 hands on rail w/ inc stability)   Number of Stairs 10   Ambulation/Elevation Additional Comments unsteady to semi-steady step through patterning without gross LOB, dec step length b/l and inc medial/lateral sway. (-) arm swing. Inc tremors and dec stability w/ SPC trial. Inc stability w/ RW (close supervision). Cues for gait mechanics and proper use of AD, stair negotiation throughout.   Balance   Static Sitting Good   Dynamic Sitting Fair +   Static Standing Fair   Dynamic Standing Fair -   Ambulatory Poor +   Activity Tolerance   Activity Tolerance Patient tolerated treatment well  (BP post 131/94, HR 88, SpO2 96% RA.)   Medical Staff Made Aware OT Dorcas   Nurse Made Aware RN cleared/updated   Assessment   Prognosis Good   Problem List Decreased strength;Impaired balance;Decreased mobility;Decreased coordination;Obesity;Impaired sensation   Assessment Pt is a 39 y.o. male y/o presenting to [facility] on 5/22/2024 with RUE/RLE weakness, dysarthria, word finding difficulties and suspected L anterior circulation CVA. S/p TNK 5/21 at 8:30PM w/ improved sxs. Primary dx: Stroke-like symptoms. Significant pmhx per chart: essential tremor s/p bifrontal neurostimulator placement, cervical dystonia, ALEKSANDRA, tobacco and alcohol abuse. PT consulted to assess strength/functional mobility, activity tolerance and d/c needs. Active PT orders and activity orders for OOB to chair. PTA, pt reports functioning at I w/ functional mobility/transfers w/o AD, ADLs, IADLS, (+)FT work, (+) fall x1,. GF able to assist upon d/c as she works from home. During PT IE, pt presenting with above (see flowsheet) outlined functional impairments including dec strength, balance, gait, and deficits that limit functional mobility and activity tolerance relative to baseline. Pt currently requires supervision for transfers, ambulation for  household distances, and CGA for elevations. Inc stability noted w/ use of RW vs. SPC/no AD. Recommend continued use of RW at this time. Pt currently demonstrating inc fall risk.  Fall risk education provided with good understanding. Education provided on B.E.F.A.S.T. acronym/proper use of AD. Denied additional sxs throughout session. Recommend continued mobilization of pt with nsg staff as tolerated to prevent further decline in function.  Pt will benefit from continued PT services to progress mobility independence necessary for return to PLOF. Based on pt presentation and impairments, pt would most appropriately benefit from d/c to home with level III (min) rehab intensity resources  and with increased supervision/assistance required to safely complete functional mobility and use of RW when medically appropriate.   Barriers to Discharge None   Goals   Patient Goals To go home   STG Expiration Date 06/05/24   Short Term Goal #1 1).  Pt will perform bed mobility with Sal demonstrating appropriate technique 100% of the time in order to improve function. 2)  Perform all transfers with Sal demonstrating safe and appropriate technique 100% of the time in order to improve ability to negotiate safely in home environment. 3) Amb with least restrictive AD > 200'x1 with mod I in order to demonstrate ability to negotiate in home environment. 4)  Improve overall strength and balance 1/2 grade in order to optimize ability to perform functional tasks and reduce fall risk. 5) Increase activity tolerance to 45 minutes in order to improve endurance to functional tasks. 6)  Negotiate >10 stairs using most appropriate technique and mod I in order to be able to negotiate safely in home environment. 7) PT for ongoing patient and family/caregiver education, DME needs and d/c planning in order to promote highest level of function in least restrictive environment.   PT Treatment Day 0   Plan   Treatment/Interventions Functional transfer  training;LE strengthening/ROM;Elevations;Therapeutic exercise;Patient/family training;Equipment eval/education;Bed mobility;Gait training;Compensatory technique education;Spoke to nursing;OT;Family   PT Frequency 3-5x/wk   Discharge Recommendation   Rehab Resource Intensity Level, PT III (Minimum Resource Intensity)   Equipment Recommended Walker   Additional Comments The patient's AM-PAC Basic Mobility Inpatient Short Form Raw Score is 18. A Raw score of greater than 16 suggests the patient may benefit from discharge to home. Please also refer to the recommendation of the Physical Therapist for safe discharge planning.   AM-PAC Basic Mobility Inpatient   Turning in Flat Bed Without Bedrails 3   Lying on Back to Sitting on Edge of Flat Bed Without Bedrails 3   Moving Bed to Chair 3   Standing Up From Chair Using Arms 3   Walk in Room 3   Climb 3-5 Stairs With Railing 3   Basic Mobility Inpatient Raw Score 18   Basic Mobility Standardized Score 41.05   [facility] Highest Level Of Mobility   JH-HLM Goal 6: Walk 10 steps or more   JH-HLM Achieved 7: Walk 25 feet or more   End of Consult   Patient Position at End of Consult Bedside chair;Other (comment);All needs within reach  (Family in room. Pt stable)       Pt requires PT/OT co-eval for pt's best interest due to medical complexity, safety concerns, fall risk, dec activity tolerance which is decline from PLOF, medical acuity.    (Please find full objective findings from PT assessment regarding body systems outlined above).     Hx/personal factors: inaccessible home environment, step(s) to enter environment, multi-level environment, recent fall(s)/fall history, ETOH use, and use of more restrictive AD, co-morbidities, mutliple lines, telemetry, use of AD, pain, fall risk, and obesity,   Examination: assessed/impairments of systems including multiple body structures involved; risk for falls, impairements in locomotion, musculoskeletal, balance, endurance, posture,  coordination, assessed cognition, use of more restrictive AD/brace/orthosis/prosthesis, gait deviations , dec activity tolerance/endurance vs baseline ,   Clinical: unpredictable (ongoing medical status, abnormal lab values, risk for falls, imaging test/result pending, telemetry, and need for input for mobility technique/safety, )  Complexity: high     Yamil Allen, PT,DPT   05/22/24

## 2024-05-22 NOTE — CONSULTS
Consultation - Neurology   Phani Cid 39 y.o. male MRN: [id number]  Unit/Bed#: ICU 06 Encounter: [id number]      Assessment & Plan     * Stroke-like symptoms  Assessment & Plan  39 y.o. male with essential tremor s/p bifrontal neurostimulator placement, cervical dystonia, ALEKSANDRA, tobacco and alcohol abuse who presented to [facility] as a stroke alert with symptoms of dysarthria, right face, arm and leg weakness presenting upon waking from a nap. LKW 5/21 at 1815. NIHSS 4.  Initial neuroimaging negative.  TNK given.     Work-up:  CTH negative for acute abnormality.  CTA H/N negative for large vessel occlusion or flow limiting stenosis.  Total cholesterol 210, Triglycerides 297, HDL 38,   A1C 5.1  TSH WNL  Echo: Technically difficult. EF 57%, normal sized atria bilaterally, intact interatrial septum with no apparent shunting    Much improved neuro exam compared to description of presenting symptoms. Patient continues to report subjective sensation of abnormal R face and tingling in RLE and continues to have minimal weakness of R deltoid (?chronic as L deltoid also not as solid), R iliopsoas and R hamstring. Patient has subtle weakness of distal LUE which he reports is chronic. Continued concern for L anterior circulation stroke vs TIA. Less likely headache variant. Continue stroke/TNK pathway. MRI pending.     Plan:  Acute ischemic stroke protocol  TNK protocol - TNK given 5/21 at 2035  Repeat CTH 24 hours post TNK as per protocol  Hold AC/AP until repeat CTH completed and read as stable.   If stable, initiate Aspirin 81mg  Atorvastatin 40mg  MRI brain wo contrast pending  Will have to clarify if MRI can be done at [facility] with DBS in place  Plan for outpatient cardiology follow-up for Zio patch/Loop recorder  Telemetry  Obtain thrombosis panel  Secondary stroke risk factor modification  Permissive HTN with max of 180/105   Goal of euglycemia and normothermia  PT/OT/ST  Stroke Education  Strongly advised for  "smoking cessation  Frequent neuro checks  Stat CT head with worsening neuro exam  Notify neurology with any changes in exam     Tobacco user  Assessment & Plan  Smoking cessation advised    Tremor  Assessment & Plan  Continue Hmed of Primidone and Propranolol  DBS in place        Recommendations for outpatient neurological follow up have yet to be determined.    History of Present Illness     Reason for Consult / Principal Problem: Stroke  Hx and PE limited by: NA  HPI: Phani Adkins is a 39 y.o. left handed male with essential tremor s/p bifrontal neurostimulator placement, cervical dystonia, ALEKSANDRA, tobacco and alcohol abuse who originally presented to [facility] as a stroke alert. LKW 1815 following which patient went to take a nap and then upon waking, patient immediately felt his R face was drooping and his RUE/RLE were weak. He also noted slurred speech and difficulty getting his words out. NIHSS 4 in the ED.  Initial CTH and CTA H/N were negative for acute intracranial abnormality and flow limiting stenosis.   TNK given at 2035.  Patient was transferred to [facility] for critical care monitoring.   According to critical care quick note from early this morning, patient's NIHSS 0, noting significant improvement in symptoms \"with only subjective residual word finding difficulty and [objective] RLE weakness.\"    This morning on neuro exam, patient also agrees his symptoms have improved significantly. He notes that he started having some improvement of his face prior to receiving TNK and then continued improvement after. He does report that he still has occasional difficulty finding his words, notes that his right face still feels not quite right and he admits to some proximal RLE weakness and tingling. Patient also admits to a 2/10 constant aching headache located in the right temporal region without radiation without associated photo/phono-phobia nor nausea. He did have a headache yesterday as well, which is " unusual for him. Patient denies diplopia. CP, palpitations, recent head or neck trauma, recent infections, SOB, or bowel/bladder issues.    At baseline patient reports drinking 4-5 drinks approximately 4x/week and smoking 1/2ppd x 20+ years. He denies drug use. He is employed as a  and reports that he just changed to night shift 3 weeks ago which has resulted in much less stress. Patient denies personal or family history of strokes, heart attacks, bleeding or clotting disorders.    The patient follows with Dr. Song, neurologist at [facility], for his essential tremor. He was last seen on 8/2/23. According to that note, patient developed ET at 19-20yo. He is s/p bilateral VIM deep brain stimulation on 1/19/15. His symptoms initially improved 80-90% until a lead fractured and symptoms regressed. He underwent removal of left sided fractured DBS lead wire and connection to existing IPG on 1/23/17 and has had about 40% improvement compared to initial symptoms, L>R. On 2/2023 patient had noted worsening tremors and there was noted to again be a lead fracture requiring removal and replacement of L DBS on 6/5/23. Patient continues with Primidone and Propranolol, as well as Botox for his cervical dystonia.  Of note, patient does report chronic L distal weakness where he has difficulty with  at baseline.     Inpatient consult to Neurology  Consult performed by: Shirley Hager PA-C  Consult ordered by: Clover Dickson PA-C          Review of Systems   HENT:  Negative for trouble swallowing.    Eyes:  Negative for photophobia and visual disturbance.   Respiratory:  Negative for shortness of breath.    Cardiovascular:  Negative for chest pain and palpitations.   Gastrointestinal:  Negative for nausea.   Neurological:  Positive for tremors (chronic LUE>RUE), facial asymmetry (R), speech difficulty (minimal occasional difficulty with word finding), weakness (RLE>RUE, improved compared to  "yesterday), numbness (R face and tingling and numbness of RLE) and headaches (2/10 R temple region).       Historical Information   Past Medical History:   Diagnosis Date    Herpes zoster     Tinea corporis      Past Surgical History:   Procedure Laterality Date    BRAIN SURGERY      INSERTION / PLACEMENT / REVISION NEUROSTIMULATOR  01/2016    with LED replacement 1/2017    KNEE SURGERY Left     TOOTH EXTRACTION       Social History   Social History     Substance and Sexual Activity   Alcohol Use Yes    Alcohol/week: 3.0 standard drinks of alcohol    Types: 3 Cans of beer per week    Comment: socially     Social History     Substance and Sexual Activity   Drug Use Never     E-Cigarette/Vaping    E-Cigarette Use Never User      E-Cigarette/Vaping Substances    Nicotine No     THC No     CBD No     Flavoring No     Other No     Unknown No      Social History     Tobacco Use   Smoking Status Every Day    Current packs/day: 0.50    Types: Cigarettes   Smokeless Tobacco Never     Family History:   Family History   Problem Relation Age of Onset    No Known Problems Mother     Pancreatic cancer Maternal Grandmother     Diabetes Paternal Grandmother        Review of previous medical records was completed.     Meds/Allergies   all current active meds have been reviewed    No Known Allergies    Objective   Vitals:Blood pressure 130/83, pulse 84, temperature 99 °F (37.2 °C), temperature source Oral, resp. rate (!) 24, height 5' 7\" (1.702 m), weight 85.3 kg (188 lb), SpO2 96%.,Body mass index is 29.44 kg/m².    Intake/Output Summary (Last 24 hours) at 5/22/2024 1232  Last data filed at 5/22/2024 1201  Gross per 24 hour   Intake 480 ml   Output --   Net 480 ml       Invasive Devices:   Invasive Devices       Peripheral Intravenous Line  Duration             Peripheral IV 05/21/24 Left Antecubital <1 day    Peripheral IV 05/21/24 Right Antecubital <1 day                    Physical Exam  Constitutional:       General: He is not " in acute distress.     Appearance: Normal appearance. He is well-developed. He is not ill-appearing.   HENT:      Head: Normocephalic and atraumatic.   Eyes:      General: No scleral icterus.        Right eye: No discharge.         Left eye: No discharge.      Extraocular Movements: Extraocular movements intact and EOM normal.      Conjunctiva/sclera: Conjunctivae normal.   Cardiovascular:      Rate and Rhythm: Normal rate and regular rhythm.   Pulmonary:      Effort: Pulmonary effort is normal. No respiratory distress.      Breath sounds: No stridor.   Musculoskeletal:         General: No tenderness or deformity. Normal range of motion.      Cervical back: Normal range of motion and neck supple.   Skin:     General: Skin is warm and dry.      Findings: No erythema or rash.   Neurological:      Mental Status: He is alert and oriented to person, place, and time.      Coordination: Heel to Shin Test normal. Finger-nose-finger test: No ataxia, L>R chronic tremor noted.  Psychiatric:         Speech: Speech normal.         Behavior: Behavior normal.         Thought Content: Thought content normal.         Judgment: Judgment normal.       Neurologic Exam     Mental Status   Oriented to person, place, and time.   Follows 2 step commands.   Attention: normal. Concentration: normal.   Speech: speech is normal (No dysarthria or aphasia)  Level of consciousness: alert  Knowledge: good.   Normal comprehension.     Cranial Nerves     CN II   Visual acuity: normal (grossly)  Right visual field deficit: none  Left visual field deficit: none     CN III, IV, VI   Extraocular motions are normal.   Nystagmus: none   Conjugate gaze: present    CN V   Facial sensation intact.     CN VIII   Hearing: intact    CN XI   CN XI normal.     CN XII   Tongue deviation: none  Face appears grossly symmetric, possibly with a very subtle asymmetry of NL folds, though difficult to confirm with facial hair.     Motor Exam   Muscle bulk:  normal  Overall muscle tone: normal  Right arm pronator drift: absent  Left arm pronator drift: absent    Strength   Strength 5/5 except as noted.   R deltoid 5- and less solid  L deltoid 5/5-  L  5-  L wrist extension 5-  (R , wrist extension/flexion and finger flexion intact)  R iliopsoas 5-  R hamstring less solid     Sensory Exam   Light touch normal.   Vibration normal.   Temperature: Increased in distal arms compared to proximal; decreased in distal BLEs compared to proximal; symmetric     Gait, Coordination, and Reflexes     Coordination   Finger-nose-finger test: No ataxia, L>R chronic tremor noted.  Heel to shin coordination: normal    Reflexes   Right plantar: normal  Left plantar: normal  Patient with baseline intermittent head/neck tremor and LUE>RUE essential tremor.    DTRs:  BUEs: 2+ throughout  BLEs: bilateral patellars and bilateral achilles brisk        Lab Results: I have personally reviewed pertinent reports.    Imaging Studies: I have personally reviewed pertinent films in PACS CTH, CTA H/N  EKG, Pathology, and Other Studies: I have personally reviewed pertinent reports.    VTE Prophylaxis: Sequential compression device (Venodyne)     Code Status: Level 1 - Full Code

## 2024-05-22 NOTE — CASE MANAGEMENT
Case Management Assessment & Discharge Planning Note    Patient name Phani Adkins  Location ICU 06/ICU 06 MRN [id number]  : 1985 Date 2024       Current Admission Date: 2024  Current Admission Diagnosis:Stroke-like symptoms   Patient Active Problem List    Diagnosis Date Noted Date Diagnosed    Stroke-like symptoms 2024     Alcohol abuse 2024     Stroke (cerebrum) (HCC) 2024     Seborrheic keratosis 2023     Tobacco user 2022     Cervical dystonia 2022     Generalized anxiety disorder 2017     Essential tremor 2016     Tremor 2013       LOS (days): 0  Geometric Mean LOS (GMLOS) (days):   Days to GMLOS:     OBJECTIVE:    Risk of Unplanned Readmission Score: 5.79       Current admission status: Inpatient     Preferred Pharmacy:   [id number] - [address] [address]  Phone: [phone] Fax: [phone]    Primary Care Provider: Dipesh Carvajal DO    Primary Insurance: [employer]  Secondary Insurance:     ASSESSMENT:  Active Health Care Proxies    There are no active Health Care Proxies on file.       Advance Directives  Does patient have a Health Care POA?: No  Was patient offered paperwork?: Yes (patient states he is working on it.)  Does patient currently have a Health Care decision maker?: Yes, please see Health Care Proxy section  Does patient have Advance Directives?: No  Was patient offered paperwork?: Yes (Patient states he is working on it)  Primary Contact: Deepti Mcghee (sig other) [phone]     Readmission Root Cause  30 Day Readmission: No    Patient Information  Admitted from:: Home  Mental Status: Alert  During Assessment patient was accompanied by: Not accompanied during assessment  Assessment information provided by:: Patient  Primary Caregiver: Self  Support Systems: Self, Spouse/significant other  [facility] Residence: [facility]  What city do you live  in?: [place]  Home entry access options. Select all that apply.: Stairs  Number of steps to enter home.: 3  Do the steps have railings?: Yes  Type of Current Residence: 2 story home  Upon entering residence, is there a bedroom on the main floor (no further steps)?: Yes  Upon entering residence, is there a bathroom on the main floor (no further steps)?: Yes  Living Arrangements: Lives w/ Spouse/significant other  Is patient a ?: No    Activities of Daily Living Prior to Admission  Functional Status: Independent  Completes ADLs independently?: Yes  Ambulates independently?: Yes  Does patient use assisted devices?: No  Does patient currently own DME?: No  Does patient have a history of Outpatient Therapy (PT/OT)?: No  Does the patient have a history of Short-Term Rehab?: No  Does patient have a history of HHC?: No  Does patient currently have HHC?: No     Patient Information Continued  Income Source: Employed  Does patient have prescription coverage?: Yes  Does patient receive dialysis treatments?: No  Does patient have a history of substance abuse?: No  Does patient have a history of Mental Health Diagnosis?: No    PHQ 2/9 Screening   Reviewed PHQ 2/9 Depression Screening Score?: No    Means of Transportation  Means of Transport to Appts:: Drives Self    Social Determinants of Health (SDOH)      Flowsheet Row Most Recent Value   Housing Stability    In the last 12 months, was there a time when you were not able to pay the mortgage or rent on time? N   In the past 12 months, how many times have you moved where you were living? 0   At any time in the past 12 months, were you homeless or living in a shelter (including now)? N   Transportation Needs    In the past 12 months, has lack of transportation kept you from medical appointments or from getting medications? no   In the past 12 months, has lack of transportation kept you from meetings, work, or from getting things needed for daily living? No   Food Insecurity     Within the past 12 months, you worried that your food would run out before you got the money to buy more. Never true   Within the past 12 months, the food you bought just didn't last and you didn't have money to get more. Never true   Utilities    In the past 12 months has the electric, gas, oil, or water company threatened to shut off services in your home? No          DISCHARGE DETAILS:    Discharge planning discussed with:: patient  Freedom of Choice: Yes     CM contacted family/caregiver?: No- see comments (family was at the bedside)     Contacts  Patient Contacts: Deepti Mcghee (sig other) [phone]  Relationship to Patient:: Other (Comment)  Contact Method: Phone  Phone Number: [phone]  Reason/Outcome: Emergency Contact    Requested Home Health Care         Is the patient interested in HHC at discharge?: No    DME Referral Provided  Referral made for DME?: No     Would you like to participate in our [facility] service program?  : No - Declined    Additional Comments: CM spoke with the patient for intake assessment and discharge planning.  PT/OT evaluation pending for discharge plan.  Patient denies knowing what her discharge needs are.  CM department will follow patient through his discharge from the hospital and will assist as recommended.

## 2024-05-22 NOTE — ASSESSMENT & PLAN NOTE
Risk factors: nicotine and alcohol use   Symptoms: RUE and RLE weakness, dysarthria, word finding difficulties  Suspect L Anterior circulation CVA  S/p TNK on 5/21 at 8:30PM  Symptoms greatly improved with only subjective residual word finding difficulties and RLE weakness. Objective only notable for RLE weakness     Plan:  Admit to ICU for frequent neuro checks per TNK admin protocol  TNK protocol in place  Fall risk  Bed rest with only bathroom privileges   STAT CTH for any exam changes   Monitor uop and iv sites  Repeat CTH at 8:30pm on 5/22 (today)  Hold dvt ppx and AP until then   Maintain SBP <180/105 with PRN hydralazine   Echo with bubble study  MRI  Hgb A1c, lipid panel f/u  Start atorvastatin 40mg   Dietician consult   PT/OT/PMR/SLP/CM consults   Monitor on tele  EKG- NSR   Prevent secondary injury- maintain normonatremia, normoglycemia, normothermia   Encourage OP smoking and alcohol cessation as well as healthy lifestyle   Appreciate neuro eval and recs

## 2024-05-22 NOTE — ASSESSMENT & PLAN NOTE
Stimulator in place for at least 5+ years     Plan:  Continue propanolol 120mg daily   Continue to monitor exam, tremors baseline for patient

## 2024-05-22 NOTE — PLAN OF CARE
Problem: OCCUPATIONAL THERAPY ADULT  Goal: Performs self-care activities at highest level of function for planned discharge setting.  See evaluation for individualized goals.  Description: Treatment Interventions: ADL retraining, Functional transfer training, UE strengthening/ROM, Endurance training, Patient/family training, Equipment evaluation/education, Neuromuscular reeducation, Compensatory technique education, Energy conservation, Activityengagement          See flowsheet documentation for full assessment, interventions and recommendations.   5/22/2024 1327 by Coty Friend OT  Note: Limitation: Decreased ADL status, Decreased UE strength, Decreased Safe judgement during ADL, Decreased endurance, Decreased self-care trans, Decreased high-level ADLs  Prognosis: Good  Assessment: Phani Adkins is a 39 y.o. male seen for OT evaluation s/p admit to [facility] on 5/22/2024 w/ Stroke-like symptoms.  Comorbidities affecting pt's functional performance at time of assessment include:   essential tremor s/p bifrontal neurostimulator placement, cervical dystonia, ALEKSANDRA, tobacco and alcohol abuse . Pt with active OT orders and activity orders for Up and OOB as tolerated. Personal factors affecting pt at time of IE include:JAIME home environment, steps within home environment, difficulty performing ADLs, difficulty performing IADLs, and difficulty performing transfers/mobility. Prior to admission, pt lives with GF in a 2 level home with 3vs 5 JAIME with rails. 1 FOS to 2nd floor bedroom / bathroom. Has a 1/2 bath on first floor. I with ADL, IADLS and mobility with no device PTA. Drives. Works full time. 1 fall reported- slipped on bottom step to basement. Upon evaluation: Pt currently requires supervision  for UB ADLs, supervision  for LB ADLs, supervision for toileting, unable to assess for bed mobility, supervision  for functional transfers, and close supervision with nodevice vs cane vs RW mobility 2* the following  deficits impacting occupational performance:weakness. VITALS during session: seated 120/83> standing 137/ 86> post mobility 131/94. HR 88- 94. O2 WNL on RA. Pt to benefit from continued skilled OT tx while in the hospital to address deficits as defined above and maximize level of functional independence w ADL's and functional mobility. Occupational Performance areas to address include: grooming, bathing/shower, toilet hygiene, dressing, health maintenance, functional mobility, and clothing management. From OT standpoint, recommendation at time of d/c would be level 3 resources (HH vs OP). OT to follow pt on caseload 2-3x/wk.     Rehab Resource Intensity Level, OT: III (Minimum Resource Intensity)       5/22/2024 1327 by Coty Friend OT  Note: Limitation: Decreased ADL status, Decreased UE strength, Decreased Safe judgement during ADL, Decreased endurance, Decreased self-care trans, Decreased high-level ADLs  Prognosis: Good  Assessment: Phani Adkins is a 39 y.o. male seen for OT evaluation s/p admit to [facility] on 5/22/2024 w/ Stroke-like symptoms.  Comorbidities affecting pt's functional performance at time of assessment include:   essential tremor s/p bifrontal neurostimulator placement, cervical dystonia, ALEKSANDRA, tobacco and alcohol abuse . Pt with active OT orders and activity orders for Up and OOB as tolerated. Personal factors affecting pt at time of IE include:JAIME home environment, steps within home environment, difficulty performing ADLs, difficulty performing IADLs, and difficulty performing transfers/mobility. Prior to admission, pt lives with GF in a 2 level home with 3vs 5 JAIME with rails. 1 FOS to 2nd floor bedroom / bathroom. Has a 1/2 bath on first floor. I with ADL, IADLS and mobility with no device PTA. Drives. Works full time. 1 fall reported- slipped on bottom step to basement. Upon evaluation: Pt currently requires supervision  for UB ADLs, supervision  for LB ADLs, supervision for toileting,  unable to assess for bed mobility, supervision  for functional transfers, and close supervision with nodevice vs cane vs RW mobility 2* the following deficits impacting occupational performance:weakness. VITALS during session: seated 120/83> standing 137/ 86> post mobility 131/94. HR 88- 94. O2 WNL on RA. Pt to benefit from continued skilled OT tx while in the hospital to address deficits as defined above and maximize level of functional independence w ADL's and functional mobility. Occupational Performance areas to address include: grooming, bathing/shower, toilet hygiene, dressing, health maintenance, functional mobility, and clothing management. From OT standpoint, recommendation at time of d/c would be level 3 resources (HH vs OP). OT to follow pt on caseload 2-3x/wk.     Rehab Resource Intensity Level, OT: III (Minimum Resource Intensity)

## 2024-05-22 NOTE — TELEMEDICINE
TeleConsultation - Stroke   [facility] - [place] 39 y.o. male MRN: [id number]  Unit/Bed#: ICU 06 Encounter: [id number]      VIRTUAL CARE DOCUMENTATION:     1. This service was provided via Telemedicine using Teams Virtual Rounding      2. Parties in the room with patient during teleconsult Family member:       3. Confidentiality My office door was closed     4. Participants No one else was in the room    5. Patient acknowledged consent and understanding of privacy and security of the  Telemedicine consult. I informed the patient that I have reviewed their record in Epic and presented the opportunity for them to ask any questions regarding the visit today.  The patient agreed to participate.    6. Time spent 40       Assessment & Plan   Assessment: This is a 39-year-old male with past medical history of essential tremor has a DBS in place and follows up with neurologist at [facility] presented with acute onset of right-sided symptoms.  CT head was negative for any acute findings which I reviewed images of.  CTA head and neck did not show any large vessel occlusion.  He was within the window for TNK.    After discussion of risk, benefits and alternative reviewing inclusion extrusion criteria decision was made to proceed with TNK.  Specifically discussed were potential benefits, risks and side effects of the proposed stroke and care i.e. the likelihood of the patient achieving their goals and any potential problems that may occur as a result of the interventions.  Reasonable alternatives to the proposed surgical interventions and care were also discussed.  Discussion and campuses risk, benefits and side effects related to the alternatives and the risks related to not receiving the proposed stroke intervention and care.  Verbal consent was obtained.  Consent was obtained by ER nurse.    TPA Decision: Thrombolytic given at another facility    Plan:   administer TNK   admit to ICu for closer monitoring, patient will need to be  transferred to ICU.   will need post TNK CT head 24 hours   no aspirin/dvt prophylaxis in light of administering TNK.   BP goal less than 180/105       History of Present Illness     Reason for Consult / Principal Problem: stroke alert  Hx and PE limited by: patient  Patient last known well: 1815  Stroke alert called: 7:41pm  Neurology time of arrival: 7:42pm  HPI: Phani Adkins is a 39 y.o. right handed male who presents with stroke like symptoms.     Stroke alert was paged at 7:41p, neurology responded at 7:41p.     NiHSS - 4     this is a 39 year old male who presented today as a stroke alert, he was last known normal at 18:15. patient went to take a nap and woke up with symptoms. wife noticed that he had right sided weakness, facial drop. she called EMs. and EMS noticed right sided weakness, arm and leg and dysarthria. Ct head negative. cta head and neck which I reviewed was also negative for LVO.     since patient is within the window for TNK with NihSS score of 4. discussed the risks and benefits with the patient and he agreed to get TNK.     Consults    Review of Systems   Constitutional: Negative.    HENT: Negative.     Eyes: Negative.    Respiratory: Negative.     Cardiovascular: Negative.    Gastrointestinal: Negative.    Endocrine: Negative.    Genitourinary: Negative.    Musculoskeletal: Negative.    Skin: Negative.    Allergic/Immunologic: Negative.    Neurological: Negative.    Hematological: Negative.    Psychiatric/Behavioral: Negative.         Historical Information   Past Medical History:   Diagnosis Date    Herpes zoster     Tinea corporis      Past Surgical History:   Procedure Laterality Date    BRAIN SURGERY      INSERTION / PLACEMENT / REVISION NEUROSTIMULATOR  01/2016    with LED replacement 1/2017    KNEE SURGERY Left     TOOTH EXTRACTION       Social History   Social History     Substance and Sexual Activity   Alcohol Use Yes    Alcohol/week: 3.0 standard drinks of alcohol    Types: 3 Cans  "of beer per week    Comment: socially     Social History     Substance and Sexual Activity   Drug Use Never     E-Cigarette/Vaping    E-Cigarette Use Never User      E-Cigarette/Vaping Substances    Nicotine No     THC No     CBD No     Flavoring No     Other No     Unknown No      Social History     Tobacco Use   Smoking Status Every Day    Current packs/day: 0.50    Types: Cigarettes   Smokeless Tobacco Never     Family History: non-contributory    Review of previous medical records was done and completed.     Meds/Allergies   current meds:   Current Facility-Administered Medications   Medication Dose Route Frequency    acetaminophen (TYLENOL) tablet 975 mg  975 mg Oral Q6H PRN    atorvastatin (LIPITOR) tablet 40 mg  40 mg Oral QPM    chlorhexidine (PERIDEX) 0.12 % oral rinse 15 mL  15 mL Mouth/Throat Q12H ZOHREH    escitalopram (LEXAPRO) tablet 5 mg  5 mg Oral Daily    folic acid (FOLVITE) tablet 1 mg  1 mg Oral Daily    hydrALAZINE (APRESOLINE) injection 5 mg  5 mg Intravenous Q6H PRN    multivitamin-minerals (CENTRUM) tablet 1 tablet  1 tablet Oral Daily    primidone (MYSOLINE) tablet 200 mg  200 mg Oral Daily    propranolol (INDERAL LA) 24 hr capsule 120 mg  120 mg Oral Daily    thiamine tablet 100 mg  100 mg Oral Daily    and PTA meds:   Prior to Admission Medications   Prescriptions Last Dose Informant Patient Reported? Taking?   Xeomin 100 units SOLR   Yes No   escitalopram (LEXAPRO) 5 mg tablet   Yes No   ibuprofen (MOTRIN) 600 mg tablet   No No   Sig: Take 1 tablet (600 mg total) by mouth every 6 (six) hours as needed for mild pain   primidone (MYSOLINE) 50 mg tablet  Self Yes No   Sig: Take 200 mg by mouth daily 4 tablets daily.   propranolol (INDERAL LA) 120 mg 24 hr capsule   Yes No      Facility-Administered Medications: None       No Known Allergies    Objective   Vitals:Blood pressure 115/72, pulse 74, temperature 99 °F (37.2 °C), temperature source Oral, resp. rate 20, height 5' 7\" (1.702 m), weight " 85.3 kg (188 lb), SpO2 95%.,Body mass index is 29.44 kg/m².    Intake/Output Summary (Last 24 hours) at 2024 0933  Last data filed at 2024 0901  Gross per 24 hour   Intake 240 ml   Output --   Net 240 ml       Invasive Devices:   Invasive Devices       Peripheral Intravenous Line  Duration             Peripheral IV 24 Left Antecubital <1 day    Peripheral IV 24 Right Antecubital <1 day                    Physical Exam  Neurologic Exam    NIHSS:  1a.Level of Consciousness: 0 = Alert   1b. LOC Questions: 0 = Answers both correctly   1c. LOC Commands: 0 = Obeys both correctly   2. Best Gaze: 0 = Normal   3. Visual: 0 = No visual field loss   4. Facial Palsy: 1=Minor paralysis (flattened nasolabial fold, asymmetric on smiling)   5a. Motor Right Arm: 1=Drift, limb holds 90 (or 45) degrees but drifts down before full 10 seconds: does not hit bed   5b. Motor Left Arm: 0=No drift, limb holds 90 (or 45) degrees for full 10 seconds   6a. Motor Right Le=Drift, limb holds 90 (or 45) degrees but drifts down before full 10 seconds: does not hit bed   6b. Motor Left Le=No drift, limb holds 90 (or 45) degrees for full 10 seconds   7. Limb Ataxia:  0=Absent   8. Sensory: 0=Normal; no sensory loss   9. Best Language:  0=No aphasia, normal   10. Dysarthria: 1=Mild to moderate, patient slurs at least some words and at worst, can be understood with some difficulty   11. Extinction and Inattention (formerly Neglect): 0=No abnormality   Total Score: 4     Time NIHSS was completed: 7:50p    Modified [place] Score:  0 (No baseline symptoms/disability)    Lab Results: CBC:   Results from last 7 days   Lab Units 24  04424   WBC Thousand/uL 8.17 7.69   RBC Million/uL 4.84 5.25   HEMOGLOBIN g/dL 14.7 15.6   HEMATOCRIT % 43.8 47.0   MCV fL 91 90   PLATELETS Thousands/uL 249 259   , BMP/CMP:   Results from last 7 days   Lab Units 24   SODIUM mmol/L 141 136   POTASSIUM  "mmol/L 4.3 3.9   CHLORIDE mmol/L 105 99   CO2 mmol/L 25 28   BUN mg/dL 6 6   CREATININE mg/dL 0.71 0.84   CALCIUM mg/dL 8.8 9.2   AST U/L 15  --    ALT U/L 25  --    ALK PHOS U/L 71  --    EGFR ml/min/1.73sq m 117 109   , Vitamin B12:   , HgBA1C:   , TSH:   , Coagulation:   Results from last 7 days   Lab Units 05/21/24  1952   INR  0.96   , Lipid Profile:   Results from last 7 days   Lab Units 05/22/24  0449   HDL mg/dL 38*   LDL CALC mg/dL 113*   TRIGLYCERIDES mg/dL 297*   , Ammonia:   , Urinalysis:       Invalid input(s): \"URIBILINOGEN\", Drug Screen:   , Medication Drug Levels:       Invalid input(s): \"CARBAMAZEPINE\", \"OXCARBAZEPINE\"  Imaging Studies: I have personally reviewed pertinent films in PACS with a Radiologist.  EKG, Pathology, and Other Studies: I have personally reviewed pertinent films in PACS  VTE Prophylaxis: Sequential compression device (Venodyne)     Code Status: Level 1 - Full Code  Advance Directive and Living Will:      Power of :    POLST:      Counseling / Coordination of Care  Total Critical Care time spent 35 minutes excluding procedures, teaching and family updates.      "

## 2024-05-22 NOTE — PLAN OF CARE
Problem: Neurological Deficit  Goal: Neurological status is stable or improving  Description: Interventions:  - Monitor and assess patient's level of consciousness, motor function, sensory function, and level of assistance needed for ADLs.   - Monitor and report changes from baseline. Collaborate with interdisciplinary team to initiate plan and implement interventions as ordered.   - Provide and maintain a safe environment.  - Consider seizure precautions.  - Consider fall precautions.  - Consider aspiration precautions.  - Consider bleeding precautions.  Problem: Communication Impairment  Goal: Ability to express needs and understand communication  Description: Assess patient's communication skills and ability to understand information.  Patient will demonstrate use of effective communication techniques, alternative methods of communication and understanding even if not able to speak.     - Encourage communication and provide alternate methods of communication as needed.  - Collaborate with case management/ for discharge needs.  - Include patient/family/caregiver in decisions related to communication.  Outcome: Progressing     Problem: NEUROSENSORY - ADULT  Goal: Achieves stable or improved neurological status  Description: INTERVENTIONS  - Monitor and report changes in neurological status  - Monitor vital signs such as temperature, blood pressure, glucose, and any other labs ordered   - Initiate measures to prevent increased intracranial pressure  - Monitor for seizure activity and implement precautions if appropriate      5/22/2024 0759 by Thelma Radford, RN  Outcome: Progressing  5/22/2024 0758 by Thelma Radford, RN  Outcome: Progressing  Goal: Remains free of injury related to seizures activity  Description: INTERVENTIONS  - Maintain airway, patient safety  and administer oxygen as ordered  - Monitor patient for seizure activity, document and report duration and description of seizure to  physician/advanced practitioner  - If seizure occurs,  ensure patient safety during seizure  - Reorient patient post seizure  - Seizure pads on all 4 side rails  - Instruct patient/family to notify RN of any seizure activity including if an aura is experienced  - Instruct patient/family to call for assistance with activity based on nursing assessment  - Administer anti-seizure medications if ordered    5/22/2024 0759 by Thelma Radford RN  Outcome: Progressing  5/22/2024 0758 by Thelma Radford RN  Outcome: Progressing  Goal: Achieves maximal functionality and self care  Description: INTERVENTIONS  - Monitor swallowing and airway patency with patient fatigue and changes in neurological status  - Encourage and assist patient to increase activity and self care.   - Encourage visually impaired, hearing impaired and aphasic patients to use assistive/communication devices  5/22/2024 0759 by Thelma Radford RN  Outcome: Progressing  5/22/2024 0758 by Thelma Radford RN  Outcome: Progressing     Outcome: Progressing

## 2024-05-22 NOTE — ASSESSMENT & PLAN NOTE
Smokes 1/2 ppd x 20 years    Plan:  Strongly encourage OP cessation- counseling and education provided  Offered nicotine patch- patient refused   Maintain spo2 >88%

## 2024-05-23 VITALS
HEART RATE: 60 BPM | HEIGHT: 67 IN | SYSTOLIC BLOOD PRESSURE: 117 MMHG | OXYGEN SATURATION: 97 % | TEMPERATURE: 97.8 F | BODY MASS INDEX: 29.72 KG/M2 | DIASTOLIC BLOOD PRESSURE: 82 MMHG | WEIGHT: 189.38 LBS | RESPIRATION RATE: 18 BRPM

## 2024-05-23 LAB
AMPHETAMINES SERPL QL SCN: NEGATIVE
ANION GAP SERPL CALCULATED.3IONS-SCNC: 7 MMOL/L (ref 4–13)
ATRIAL RATE: 88 BPM
BARBITURATES UR QL: POSITIVE
BENZODIAZ UR QL: NEGATIVE
BUN SERPL-MCNC: 10 MG/DL (ref 5–25)
CALCIUM SERPL-MCNC: 9.1 MG/DL (ref 8.4–10.2)
CHLORIDE SERPL-SCNC: 104 MMOL/L (ref 96–108)
CO2 SERPL-SCNC: 28 MMOL/L (ref 21–32)
COCAINE UR QL: NEGATIVE
CREAT SERPL-MCNC: 0.79 MG/DL (ref 0.6–1.3)
ERYTHROCYTE [DISTWIDTH] IN BLOOD BY AUTOMATED COUNT: 12.1 % (ref 11.6–15.1)
FENTANYL UR QL SCN: NEGATIVE
GFR SERPL CREATININE-BSD FRML MDRD: 112 ML/MIN/1.73SQ M
GLUCOSE SERPL-MCNC: 95 MG/DL (ref 65–140)
HCT VFR BLD AUTO: 42.7 % (ref 36.5–49.3)
HGB BLD-MCNC: 14.5 G/DL (ref 12–17)
HYDROCODONE UR QL SCN: NEGATIVE
MCH RBC QN AUTO: 30.1 PG (ref 26.8–34.3)
MCHC RBC AUTO-ENTMCNC: 34 G/DL (ref 31.4–37.4)
MCV RBC AUTO: 89 FL (ref 82–98)
METHADONE UR QL: NEGATIVE
OPIATES UR QL SCN: NEGATIVE
OXYCODONE+OXYMORPHONE UR QL SCN: NEGATIVE
P AXIS: 60 DEGREES
PCP UR QL: NEGATIVE
PLATELET # BLD AUTO: 218 THOUSANDS/UL (ref 149–390)
PMV BLD AUTO: 9.2 FL (ref 8.9–12.7)
POTASSIUM SERPL-SCNC: 3.9 MMOL/L (ref 3.5–5.3)
PR INTERVAL: 166 MS
QRS AXIS: 101 DEGREES
QRSD INTERVAL: 92 MS
QT INTERVAL: 362 MS
QTC INTERVAL: 438 MS
RBC # BLD AUTO: 4.81 MILLION/UL (ref 3.88–5.62)
SODIUM SERPL-SCNC: 139 MMOL/L (ref 135–147)
T WAVE AXIS: 26 DEGREES
THC UR QL: NEGATIVE
VENTRICULAR RATE: 88 BPM
WBC # BLD AUTO: 7.62 THOUSAND/UL (ref 4.31–10.16)

## 2024-05-23 PROCEDURE — 93010 ELECTROCARDIOGRAM REPORT: CPT | Performed by: INTERNAL MEDICINE

## 2024-05-23 PROCEDURE — 99239 HOSP IP/OBS DSCHRG MGMT >30: CPT | Performed by: INTERNAL MEDICINE

## 2024-05-23 PROCEDURE — 99232 SBSQ HOSP IP/OBS MODERATE 35: CPT | Performed by: STUDENT IN AN ORGANIZED HEALTH CARE EDUCATION/TRAINING PROGRAM

## 2024-05-23 PROCEDURE — 85027 COMPLETE CBC AUTOMATED: CPT

## 2024-05-23 PROCEDURE — 80307 DRUG TEST PRSMV CHEM ANLYZR: CPT

## 2024-05-23 PROCEDURE — 80048 BASIC METABOLIC PNL TOTAL CA: CPT

## 2024-05-23 RX ORDER — ATORVASTATIN CALCIUM 40 MG/1
40 TABLET, FILM COATED ORAL EVERY EVENING
Qty: 30 TABLET | Refills: 0 | Status: SHIPPED | OUTPATIENT
Start: 2024-05-23 | End: 2024-06-22

## 2024-05-23 RX ADMIN — MULTIPLE VITAMINS W/ MINERALS TAB 1 TABLET: TAB ORAL at 08:21

## 2024-05-23 RX ADMIN — PRIMIDONE 200 MG: 50 TABLET ORAL at 09:18

## 2024-05-23 RX ADMIN — ESCITALOPRAM OXALATE 5 MG: 10 TABLET ORAL at 08:21

## 2024-05-23 RX ADMIN — ASPIRIN 81 MG: 81 TABLET, COATED ORAL at 11:19

## 2024-05-23 RX ADMIN — THIAMINE HCL TAB 100 MG 100 MG: 100 TAB at 08:21

## 2024-05-23 RX ADMIN — FOLIC ACID 1 MG: 1 TABLET ORAL at 08:21

## 2024-05-23 RX ADMIN — PROPRANOLOL HYDROCHLORIDE 120 MG: 60 CAPSULE, EXTENDED RELEASE ORAL at 09:19

## 2024-05-23 NOTE — NURSING NOTE
AVS reviewed with patient and significant other. Questions answered at this time. IV removed from patient. Work note provided as well as smoking cessation materials and additional stroke education. Patient verbalized understanding. Patient discharged via wheelchair accompanied by PCA and significant other.

## 2024-05-23 NOTE — PLAN OF CARE
Problem: Prexisting or High Potential for Compromised Skin Integrity  Goal: Skin integrity is maintained or improved  Description: INTERVENTIONS:  - Identify patients at risk for skin breakdown  - Assess and monitor skin integrity  - Assess and monitor nutrition and hydration status  - Monitor labs   - Assess for incontinence   - Turn and reposition patient  - Assist with mobility/ambulation  - Relieve pressure over bony prominences  - Avoid friction and shearing  - Provide appropriate hygiene as needed including keeping skin clean and dry  - Evaluate need for skin moisturizer/barrier cream  - Collaborate with interdisciplinary team   - Patient/family teaching  - Consider wound care consult   Outcome: Progressing     Problem: NEUROSENSORY - ADULT  Goal: Achieves stable or improved neurological status  Description: INTERVENTIONS  - Monitor and report changes in neurological status  - Monitor vital signs such as temperature, blood pressure, glucose, and any other labs ordered   - Initiate measures to prevent increased intracranial pressure  - Monitor for seizure activity and implement precautions if appropriate      Outcome: Progressing  Goal: Remains free of injury related to seizures activity  Description: INTERVENTIONS  - Maintain airway, patient safety  and administer oxygen as ordered  - Monitor patient for seizure activity, document and report duration and description of seizure to physician/advanced practitioner  - If seizure occurs,  ensure patient safety during seizure  - Reorient patient post seizure  - Seizure pads on all 4 side rails  - Instruct patient/family to notify RN of any seizure activity including if an aura is experienced  - Instruct patient/family to call for assistance with activity based on nursing assessment  - Administer anti-seizure medications if ordered    Outcome: Progressing  Goal: Achieves maximal functionality and self care  Description: INTERVENTIONS  - Monitor swallowing and  airway patency with patient fatigue and changes in neurological status  - Encourage and assist patient to increase activity and self care.   - Encourage visually impaired, hearing impaired and aphasic patients to use assistive/communication devices  Outcome: Progressing     Problem: METABOLIC, FLUID AND ELECTROLYTES - ADULT  Goal: Electrolytes maintained within normal limits  Description: INTERVENTIONS:  - Monitor labs and assess patient for signs and symptoms of electrolyte imbalances  - Administer electrolyte replacement as ordered  - Monitor response to electrolyte replacements, including repeat lab results as appropriate  - Instruct patient on fluid and nutrition as appropriate  Outcome: Progressing  Goal: Fluid balance maintained  Description: INTERVENTIONS:  - Monitor labs   - Monitor I/O and WT  - Instruct patient on fluid and nutrition as appropriate  - Assess for signs & symptoms of volume excess or deficit  Outcome: Progressing  Goal: Glucose maintained within target range  Description: INTERVENTIONS:  - Monitor Blood Glucose as ordered  - Assess for signs and symptoms of hyperglycemia and hypoglycemia  - Administer ordered medications to maintain glucose within target range  - Assess nutritional intake and initiate nutrition service referral as needed  Outcome: Progressing     Problem: MUSCULOSKELETAL - ADULT  Goal: Maintain or return mobility to safest level of function  Description: INTERVENTIONS:  - Assess patient's ability to carry out ADLs; assess patient's baseline for ADL function and identify physical deficits which impact ability to perform ADLs (bathing, care of mouth/teeth, toileting, grooming, dressing, etc.)  - Assess/evaluate cause of self-care deficits   - Assess range of motion  - Assess patient's mobility  - Assess patient's need for assistive devices and provide as appropriate  - Encourage maximum independence but intervene and supervise when necessary  - Involve family in performance of  ADLs  - Assess for home care needs following discharge   - Consider OT consult to assist with ADL evaluation and planning for discharge  - Provide patient education as appropriate  Outcome: Progressing  Goal: Maintain proper alignment of affected body part  Description: INTERVENTIONS:  - Support, maintain and protect limb and body alignment  - Provide patient/ family with appropriate education  Outcome: Progressing     Problem: SAFETY ADULT  Goal: Patient will remain free of falls  Description: INTERVENTIONS:  - Educate patient/family on patient safety including physical limitations  - Instruct patient to call for assistance with activity   - Consult OT/PT to assist with strengthening/mobility   - Keep Call bell within reach  - Keep bed low and locked with side rails adjusted as appropriate  - Keep care items and personal belongings within reach  - Initiate and maintain comfort rounds  - Make Fall Risk Sign visible to staff  - Offer Toileting   - Initiate/Maintain alarm  - Obtain necessary fall risk management equipment  - Apply yellow socks and bracelet for high fall risk patients  - Consider moving patient to room near nurses station  Outcome: Progressing  Goal: Maintain or return to baseline ADL function  Description: INTERVENTIONS:  -  Assess patient's ability to carry out ADLs; assess patient's baseline for ADL function and identify physical deficits which impact ability to perform ADLs (bathing, care of mouth/teeth, toileting, grooming, dressing, etc.)  - Assess/evaluate cause of self-care deficits   - Assess range of motion  - Assess patient's mobility; develop plan if impaired  - Assess patient's need for assistive devices and provide as appropriate  - Encourage maximum independence but intervene and supervise when necessary  - Involve family in performance of ADLs  - Assess for home care needs following discharge   - Consider OT consult to assist with ADL evaluation and planning for discharge  - Provide  patient education as appropriate  Outcome: Progressing  Goal: Maintains/Returns to pre admission functional level  Description: INTERVENTIONS:  - Perform AM-PAC 6 Click Basic Mobility/ Daily Activity assessment daily.  - Set and communicate daily mobility goal to care team and patient/family/caregiver.   - Collaborate with rehabilitation services on mobility goals if consulted  - Perform Range of Motion   - Reposition patient  - Dangle patient   - Stand patient   - Ambulate patient  - Out of bed to chair   - Out of bed for meals   - Out of bed for toileting  - Record patient progress and toleration of activity level   Outcome: Progressing     Problem: DISCHARGE PLANNING  Goal: Discharge to home or other facility with appropriate resources  Description: INTERVENTIONS:  - Identify barriers to discharge w/patient and caregiver  - Arrange for needed discharge resources and transportation as appropriate  - Identify discharge learning needs (meds, wound care, etc.)  - Arrange for interpretive services to assist at discharge as needed  - Refer to Case Management Department for coordinating discharge planning if the patient needs post-hospital services based on physician/advanced practitioner order or complex needs related to functional status, cognitive ability, or social support system  Outcome: Progressing     Problem: Knowledge Deficit  Goal: Patient/family/caregiver demonstrates understanding of disease process, treatment plan, medications, and discharge instructions  Description: Complete learning assessment and assess knowledge base.  Interventions:  - Provide teaching at level of understanding  - Provide teaching via preferred learning methods  Outcome: Progressing     Problem: Neurological Deficit  Goal: Neurological status is stable or improving  Description: Interventions:  - Monitor and assess patient's level of consciousness, motor function, sensory function, and level of assistance needed for ADLs.   - Monitor  and report changes from baseline. Collaborate with interdisciplinary team to initiate plan and implement interventions as ordered.   - Provide and maintain a safe environment.  - Consider seizure precautions.  - Consider fall precautions.  - Consider aspiration precautions.  - Consider bleeding precautions.  Outcome: Progressing     Problem: Activity Intolerance/Impaired Mobility  Goal: Mobility/activity is maintained at optimum level for patient  Description: Interventions:  - Assess and monitor patient  barriers to mobility and need for assistive/adaptive devices.  - Assess patient's emotional response to limitations.  - Collaborate with interdisciplinary team and initiate plans and interventions as ordered.  - Encourage independent activity per ability.  - Maintain proper body alignment.  - Perform active/passive rom as tolerated/ordered.  - Plan activities to conserve energy.  - Turn patient as appropriate  Outcome: Progressing     Problem: Communication Impairment  Goal: Ability to express needs and understand communication  Description: Assess patient's communication skills and ability to understand information.  Patient will demonstrate use of effective communication techniques, alternative methods of communication and understanding even if not able to speak.     - Encourage communication and provide alternate methods of communication as needed.  - Collaborate with case management/ for discharge needs.  - Include patient/family/caregiver in decisions related to communication.  Outcome: Progressing     Problem: Potential for Aspiration  Goal: Non-ventilated patient's risk of aspiration is minimized  Description: Assess and monitor vital signs, respiratory status, and labs (WBC).  Monitor for signs of aspiration (tachypnea, cough, rales, wheezing, cyanosis, fever).    - Assess and monitor patient's ability to swallow.  - Place patient up in chair to eat if possible.  - HOB up at 90 degrees to eat  if unable to get patient up into chair.  - Supervise patient during oral intake.   - Instruct patient/ family to take small bites.  - Instruct patient/ family to take small single sips when taking liquids.  - Follow patient-specific strategies generated by speech pathologist.  Outcome: Progressing  Goal: Ventilated patient's risk of aspiration is minimized  Description: Assess and monitor vital signs, respiratory status, airway cuff pressure, and labs (WBC).  Monitor for signs of aspiration (tachypnea, cough, rales, wheezing, cyanosis, fever).    - Elevate head of bed 30 degrees if patient has tube feeding.  - Monitor tube feeding.  Outcome: Progressing     Problem: Nutrition  Goal: Nutrition/Hydration status is improving  Description: Monitor and assess patient's nutrition/hydration status for malnutrition (ex- brittle hair, bruises, dry skin, pale skin and conjunctiva, muscle wasting, smooth red tongue, and disorientation). Collaborate with interdisciplinary team and initiate plan and interventions as ordered.  Monitor patient's weight and dietary intake as ordered or per policy. Utilize nutrition screening tool and intervene per policy. Determine patient's food preferences and provide high-protein, high-caloric foods as appropriate.     - Assist patient with eating.  - Allow adequate time for meals.  - Encourage patient to take dietary supplement as ordered.  - Collaborate with clinical nutritionist.  - Include patient/family/caregiver in decisions related to nutrition.  Outcome: Progressing

## 2024-05-23 NOTE — DISCHARGE SUMMARY
[facility]  Discharge- Phani Adkins 1985, 39 y.o. male MRN: [id number]  Unit/Bed#: E2 -01 Encounter: [id number]  Primary Care Provider: Dipesh Carvajal DO   Date and time admitted to hospital: 5/22/2024 12:12 AM    Admitting Provider:  Griffin Chavis MD  Discharge Provider:  Javier Rubalcava DO  Admission Date: 5/22/2024       Discharge Date: 05/23/24   LOS: 1  Primary Care Physician at Discharge: Dipesh Carvajal [phone]    HOSPITAL COURSE:  Phani Adkins is a 39 y.o. male who presented initially to the [facility] as a stroke alert.  The patient has a past medical history of essential tremor status post bifrontal neurostimulator placement, history of cervical dystonia, general anxiety disorder, as well as nicotine dependence.  The patient had presented with right facial droop as well as right upper extremity and right lower extremity weakness.  This was concerning for left hemispheric stroke.  His NIH stroke score was 4 calculated in the emergency room.  Initial CT of the head as well as CTA were negative for acute intracranial abnormality and flow-limiting stenosis.    Given disabling symptoms, the patient was given TNK.  He was transferred to [facility] for critical care monitoring.  The patient had rapid improvement post TNK administration he had significant improvement with repeat NIH stroke score of 0.    Patient was monitored in the intensive care unit, he was evaluated in consultation by the neurology service.  He does have a history of modest alcohol consumption drinking 4-5 drinks approximately 4 times a week.  There was no evidence of withdrawal.    The patient subsequently was transferred out of the ICU, unfortunately his deep brain stimulator is not MRI safe.    The patient had repeat neuroimaging which was negative for acute changes.    The patient will be discharged home with planned outpatient follow-up with his primary neurologist.   A referral to physical therapy and Occupational Therapy was also made and the patient will need outpatient Zio patch monitoring to evaluate for atrial fibrillation.      At the time of discharge the patient was tolerating oral diet they were without acute complaint and they were medically cleared for discharge.  All questions were answered the patient's satisfaction and they were in agreement with the discharge plan.    DISCHARGE DIAGNOSES  * Stroke-like symptoms  Assessment & Plan  Possible left hemispheric stroke, patient received TNK  Deep brain stimulator not MRI compatible  Repeat head CT stable  Will be discharged on aspirin 81 mg, Lipitor  Outpatient physical therapy and Occupational Therapy  Outpatient follow-up with Dr. Castro at [facility]  Cardiology consultation placed for Zio patch monitor    Alcohol abuse  Assessment & Plan  No evidence of withdrawal, received thiamine inpatient  Outpatient follow-up    Cervical dystonia  Assessment & Plan  History of deep brain stimulator placement followed at [facility]  Continue primidone 200 mg  Continue propranolol    Tobacco user  Assessment & Plan  Recommend nicotine cessation    Generalized anxiety disorder  Assessment & Plan  Mood is stable, continue Lexapro      CONSULTING PROVIDERS   IP CONSULT TO CASE MANAGEMENT  IP CONSULT TO PHYSICAL MEDICINE REHAB  IP CONSULT TO NEUROLOGY  IP CONSULT TO CASE MANAGEMENT  IP CONSULT TO NUTRITION SERVICES    PROCEDURES PERFORMED  * No surgery found *    RADIOLOGY RESULTS  CT head wo contrast    Result Date: 5/22/2024  Impression: No acute intracranial abnormality. Workstation performed: [id number]       LABS  Results from last 7 days   Lab Units 05/23/24  0519 05/22/24  0449 05/21/24  1952   WBC Thousand/uL 7.62 8.17 7.69   HEMOGLOBIN g/dL 14.5 14.7 15.6   HEMATOCRIT % 42.7 43.8 47.0   MCV fL 89 91 90   PLATELETS Thousands/uL 218 249 259   INR   --   --  0.96     Results from last 7 days   Lab Units  "05/23/24 0519 05/22/24 0449 05/21/24 1952   SODIUM mmol/L 139 141 136   POTASSIUM mmol/L 3.9 4.3 3.9   CHLORIDE mmol/L 104 105 99   CO2 mmol/L 28 25 28   BUN mg/dL 10 6 6   CREATININE mg/dL 0.79 0.71 0.84   CALCIUM mg/dL 9.1 8.8 9.2   ALBUMIN g/dL  --  4.2  --    TOTAL BILIRUBIN mg/dL  --  0.43  --    ALK PHOS U/L  --  71  --    ALT U/L  --  25  --    AST U/L  --  15  --    EGFR ml/min/1.73sq m 112 117 109   GLUCOSE RANDOM mg/dL 95 78 91     Results from last 7 days   Lab Units 05/21/24 2231 05/21/24 1952   HS TNI 0HR ng/L  --  <2   HS TNI 2HR ng/L <2  --               Results from last 7 days   Lab Units 05/21/24 1949   POC GLUCOSE mg/dl 111     Results from last 7 days   Lab Units 05/22/24 0449   HEMOGLOBIN A1C % 5.1     Results from last 7 days   Lab Units 05/22/24 0449   TSH 3RD GENERATON uIU/mL 0.965         Results from last 7 days   Lab Units 05/22/24 0449   TRIGLYCERIDES mg/dL 297*   CHOLESTEROL mg/dL 210*   LDL CALC mg/dL 113*   HDL mg/dL 38*       Cultures:         Invalid input(s): \"URIBILINOGEN\"        Results from last 7 days   Lab Units 05/21/24 2137   INFLUENZA A PCR  Negative     Results from last 7 days   Lab Units 05/21/24 2137   SARS-COV-2  Negative   INFLUENZA A PCR  Negative   INFLUENZA B PCR  Negative   RSV PCR  Negative         PHYSICAL EXAM:  Vitals:   Blood Pressure: 117/82 (05/23/24 1107)  Pulse: 60 (05/23/24 1107)  Temperature: 97.8 °F (36.6 °C) (05/23/24 1107)  Temp Source: Oral (05/23/24 1107)  Respirations: 18 (05/23/24 1107)  Height: 5' 7\" (170.2 cm) (05/22/24 0730)  Weight - Scale: 85.9 kg (189 lb 6 oz) (05/23/24 0600)  SpO2: 97 % (05/23/24 1107)      General: well appearing, no acute distress  HEENT: atraumatic, PERRLA, moist mucosa, normal pharynx, normal tonsils and adenoids, normal tongue, no fluid in sinuses  Neck: Trachea midline, no carotid bruit, no masses  Respiratory: normal chest wall expansion, CTA B  Cardiovascular: RRR, no m/r/g, Normal S1 and S2  Abdomen: " Soft, non-tender, non-distended, normal bowel sounds in all quadrants, no hepatosplenomegaly, no tympany  Rectal: deferred  Musculoskeletal: Moves all  Integumentary: warm, dry, and pink, with no visible rash, purpura, or petechia  Heme/Lymph: no lymphadenopathy, no bruises  Neurological: Cranial Nerves II-XII grossly intact  Psychiatric: cooperative with normal mood, affect, and cognition       Discharge Disposition:Home  AM-PAC Basic Mobility:  Basic Mobility Inpatient Raw Score: 18    JH-HLM Achieved: 6: Walk 10 steps or more  JH-HLM Goal: 6: Walk 10 steps or more    HLM Goal listed above. Continue with ongoing physical therapy and encourage appropriate mobility to improve upon HLM goals.      Test Results Pending at Discharge:   Pending Labs       Order Current Status    Antithrombin III Activity In process    Beta-2 glycoprotein antibodies In process    Cardiolipin antibody In process    Lupus anticoagulant In process    Protein C activity In process    Protein S Antigen, Total & Free In process    Protein S activity In process    Thrombosis Panel In process                Medications   Summary of Medication Adjustments made as a result of this hospitalization: See discharge summary and AVS for medication changes  Medication Dosing Tapers - Please refer to Discharge Medication List for details on any medication dosing tapers (if applicable to patient).  Discharge Medication List: See after visit summary for reconciled discharge medications.     Diet restrictions:         Diet Orders   (From admission, onward)                 Start     Ordered    05/22/24 1153  Diet Cardiovascular; Cardiac  Diet effective now        References:    Adult Nutrition Support Algorithm    RD Therapeutic Diet Order Protocol   Question Answer Comment   Diet Type Cardiovascular    Cardiac Cardiac    RD to adjust diet per protocol? Yes        05/22/24 1152                  Activity restrictions: No strenuous activity  Discharge  Condition: good    Outpatient Follow-Up and Discharge Instructions  See after visit summary section titled Discharge Instructions for information provided to patient and family.      Code Status: Level 1 - Full Code  Discharge Statement   I spent 86 minutes discharging the patient. This time was spent on the day of discharge. Greater than 50% of total time was spent with the patient and / or family counseling and / or coordination of care.    ** Please Note: This note was completed in part utilizing [employer] Dragon [employer] One Software.  Grammatical errors, random word insertions, spelling mistakes, and incomplete sentences may be an occasional consequence of this system secondary to software limitations, ambient noise, and hardware issues.  If you have any questions or concerns about the content, text, or information contained within the body of this dictation, please contact the provider for clarification.**

## 2024-05-23 NOTE — PROGRESS NOTES
Progress Note - Neurology   Phanifemi Galecontreras 39 y.o. male MRN: [id number]  Unit/Bed#: E2 -01 Encounter: [id number]      Assessment & Plan     * Stroke-like symptoms  Assessment & Plan  39 y.o. male with essential tremor s/p bifrontal neurostimulator placement, cervical dystonia, ALEKSANDRA, tobacco and alcohol abuse who presented to [facility] as a stroke alert with symptoms of dysarthria, right face, arm and leg weakness presenting upon waking from a nap. LKW 5/21 at 1815. NIHSS 4.  Initial neuroimaging negative.  TNK given.     Work-up:  Repeat CTH 24 hours post TNK negative for acute intracranial abnormality.  Artifact from bilateral DBS leads noted.  Initial CTH negative for acute abnormality.  CTA H/N negative for large vessel occlusion or flow limiting stenosis.  Total cholesterol 210, Triglycerides 297, HDL 38,   A1C 5.1  TSH WNL  Echo: Technically difficult. EF 57%, normal sized atria bilaterally, intact interatrial septum with no apparent shunting    Per most recent Neurology note from January 2024, patient's left DBS lead is not MRI compatible due to impedance.  No acute stroke noted on repeat head CT; however, small stroke may not be apparent on CT and there is also significant artifact from his DBS leads which could obscure ischemic change.  Will treat as TIA/stroke.    Plan:  Acute ischemic stroke protocol  TNK protocol - TNK given 5/21 at 2035  Initiate Aspirin 81mg and continue indefinitely  Atorvastatin 40mg  Plan for outpatient cardiology follow-up for Zio patch/Loop recorder  Telemetry  Thrombosis panel results pending  Secondary stroke risk factor modification  Goal normotension  Goal of euglycemia and normothermia  PT/OT/ST  Stroke Education  Strongly advised smoking cessation  Patient to follow-up with his regular neurology team at [facility] upon discharge.  Advised he should call to make an appointment within the next several weeks.          Phani Galecontreras will  "need follow up in in 6 weeks with neurovascular attending PATIENT PREFERS TO FOLLOW WITH HIS REGULAR NEUROLOGIST AT [facility] AND WILL CALL TO SCHEDULE AN APPT. He will not require outpatient neurological testing. Will require outpatient Zio patch/ILR.     Subjective:   Patient able to ambulate to the restroom unassisted and per his significant other, Deepti, speech is back at baseline.  Patient feels his strength has returned and has no new complaints.  Unfortunately, he was discovered that his left DBS lead is not MRI compatible due to impedance.    ROS: 12 point review of systems performed and negative except as indicated above.    Vitals: Blood pressure 117/82, pulse 60, temperature 97.8 °F (36.6 °C), temperature source Oral, resp. rate 18, height 5' 7\" (1.702 m), weight 85.9 kg (189 lb 6 oz), SpO2 97%.,Body mass index is 29.66 kg/m².    Physical Exam:   General:  Patient is well-developed, well-nourished, and in no acute distress.  HEENT:  Head normocephalic.  Eyes anicteric.    Cardiovascular:  With regular rhythm.  Lungs:  Normal effort. Nonlabored breathing.  Extremities:  With no significant edema.    Skin: No rashes.  Neurologic:  Patient is alert, pleasantly interactive, and appropriately conversational.  No obvious symbolic language difficulty or dysarthria, and the patient is fully oriented.      Gait deferred for safety.    Cranial Nerves:   II: Visual fields full to confrontation.  Cannot visualize optic fundi.  III,IV,VI: extraocular movements intact with no nystagmus.   V: Sensation in the V1 through V3 distributions intact to light touch bilaterally.   VII: Face is symmetric with no weakness noted.   XII: Tongue midline with no atrophy or fasciculations with appropriate movement.     Coordination:  Accurate with finger-to-nose and heel-to-shin maneuvers bilaterally.  Head tremor, voice tremor, and bilateral upper extremity moderate frequency, moderate amplitude tremor noted, exaggerated " when in sustention and when aiming for target with finger-to-nose testing.    Motor testing with no upper or lower extremity drift. Full strength accommodation throughout.    Sensory testing grossly intact to light touch throughout.     Lab, Imaging and other studies: CBC:   Results from last 7 days   Lab Units 05/23/24 0519 05/22/24 0449 05/21/24 1952   WBC Thousand/uL 7.62 8.17 7.69   RBC Million/uL 4.81 4.84 5.25   HEMOGLOBIN g/dL 14.5 14.7 15.6   HEMATOCRIT % 42.7 43.8 47.0   MCV fL 89 91 90   PLATELETS Thousands/uL 218 249 259   , BMP/CMP:   Results from last 7 days   Lab Units 05/23/24 0519 05/22/24 0449 05/21/24 1952   SODIUM mmol/L 139 141 136   POTASSIUM mmol/L 3.9 4.3 3.9   CHLORIDE mmol/L 104 105 99   CO2 mmol/L 28 25 28   BUN mg/dL 10 6 6   CREATININE mg/dL 0.79 0.71 0.84   CALCIUM mg/dL 9.1 8.8 9.2   AST U/L  --  15  --    ALT U/L  --  25  --    ALK PHOS U/L  --  71  --    EGFR ml/min/1.73sq m 112 117 109   , HgBA1C:   Results from last 7 days   Lab Units 05/22/24 0449   HEMOGLOBIN A1C % 5.1   , Lipid Profile:   Results from last 7 days   Lab Units 05/22/24 0449   HDL mg/dL 38*   LDL CALC mg/dL 113*   TRIGLYCERIDES mg/dL 297*     VTE Prophylaxis: Sequential compression device (Venodyne)     Counseling / Coordination of Care  I have spent a total time of 45 minutes on 05/23/24 in caring for this patient including Diagnostic results, Prognosis, Risks and benefits of tx options, Instructions for management, Patient and family education, Importance of tx compliance, Risk factor reductions, Impressions, Counseling / Coordination of care, Documenting in the medical record, Reviewing / ordering tests, medicine, procedures  , Obtaining or reviewing history  , and Communicating with other healthcare professionals .

## 2024-05-23 NOTE — DISCHARGE INSTR - AVS FIRST PAGE
Dear Phani Adkins,     It was our pleasure to care for you here at [facility].  It is our hope that we were always able to exceed the expected standards for your care during your stay.  You were hospitalized due to strokelike symptoms concerning for left hemispheric stroke.  You were cared for on the second floor by Javier Rubalcava DO with the [facility] who covers for your primary care physician (PCP), Dipesh Carvajal DO, while you were hospitalized.  If you have any questions or concerns related to this hospitalization, you may contact us at .  For follow up as well as any medication refills, we recommend that you follow up with your primary care physician.  A registered nurse will reach out to you by phone within a few days after your discharge to answer any additional questions that you may have after going home.  However, at this time we provide for you here, the most important instructions / recommendations at discharge:     Notable Medication Adjustments -   Aspirin 81 mg  Lipitor 40 mg  Testing Required after Discharge -   Holter monitor, this is called a Zio patch  Cardiology consultation has been placed-this will be applied in the office  Important follow up information -   PCP follow-up in 1 week  Neurology follow-up with Dr. Castro in 2 to 4 weeks  Other Instructions -   Physical therapy and Occupational Therapy consultations have been placed to improve your strength  Please review this entire after visit summary as additional general instructions including medication list, appointments, activity, diet, any pertinent wound care, and other additional recommendations from your care team that may be provided for you.      Sincerely,     Javier Rubalcava DO and Nurse Nandini Garay

## 2024-05-23 NOTE — PLAN OF CARE
Problem: NEUROSENSORY - ADULT  Goal: Achieves stable or improved neurological status  Description: INTERVENTIONS  - Monitor and report changes in neurological status  - Monitor vital signs such as temperature, blood pressure, glucose, and any other labs ordered   - Initiate measures to prevent increased intracranial pressure  - Monitor for seizure activity and implement precautions if appropriate      Outcome: Progressing     Problem: NEUROSENSORY - ADULT  Goal: Achieves maximal functionality and self care  Description: INTERVENTIONS  - Monitor swallowing and airway patency with patient fatigue and changes in neurological status  - Encourage and assist patient to increase activity and self care.   - Encourage visually impaired, hearing impaired and aphasic patients to use assistive/communication devices  Outcome: Progressing     Problem: DISCHARGE PLANNING  Goal: Discharge to home or other facility with appropriate resources  Description: INTERVENTIONS:  - Identify barriers to discharge w/patient and caregiver  - Arrange for needed discharge resources and transportation as appropriate  - Identify discharge learning needs (meds, wound care, etc.)  - Arrange for interpretive services to assist at discharge as needed  - Refer to Case Management Department for coordinating discharge planning if the patient needs post-hospital services based on physician/advanced practitioner order or complex needs related to functional status, cognitive ability, or social support system  Outcome: Progressing

## 2024-05-23 NOTE — ASSESSMENT & PLAN NOTE
History of deep brain stimulator placement followed at [facility]  Continue primidone 200 mg  Continue propranolol

## 2024-05-23 NOTE — ASSESSMENT & PLAN NOTE
Possible left hemispheric stroke, patient received TNK  Deep brain stimulator not MRI compatible  Repeat head CT stable  Will be discharged on aspirin 81 mg, Lipitor  Outpatient physical therapy and Occupational Therapy  Outpatient follow-up with Dr. Castro at [facility]  Cardiology consultation placed for Zio patch monitor

## 2024-05-23 NOTE — PROGRESS NOTES
Critical Care Interval Transfer Note:    Please refer to progress note from earlier today for full details.     Barriers to discharge:   Continued stroke protocol, OP f/u needed  Continue tele     Consults: IP CONSULT TO CASE MANAGEMENT  IP CONSULT TO PHYSICAL MEDICINE REHAB  IP CONSULT TO NEUROLOGY  IP CONSULT TO CASE MANAGEMENT  IP CONSULT TO NUTRITION SERVICES    Recommended to review admission imaging for incidental findings and document in discharge navigator: Chart reviewed, no known incidental findings noted at this time.      Discharge Plan: Anticipate discharge in 24-48 hrs to home.            Patient seen and evaluated by Critical Care today and deemed to be appropriate for transfer to Med Surg with Tele. Spoke to Cammie Gonzalez from [facility] to accept transfer. Critical care can be contacted via [employer] with any questions or concerns.

## 2024-05-24 ENCOUNTER — TRANSITIONAL CARE MANAGEMENT (OUTPATIENT)
Dept: FAMILY MEDICINE CLINIC | Facility: CLINIC | Age: 39
End: 2024-05-24

## 2024-05-24 LAB
B2 GLYCOPROT1 IGA SERPL IA-ACNC: 1.8
B2 GLYCOPROT1 IGG SERPL IA-ACNC: 0.8
B2 GLYCOPROT1 IGM SERPL IA-ACNC: <2.4
CARDIOLIPIN IGA SER IA-ACNC: 1.8
CARDIOLIPIN IGG SER IA-ACNC: 1.5
CARDIOLIPIN IGM SER IA-ACNC: 1.4
DEPRECATED AT III PPP: 117 % OF NORMAL (ref 92–136)

## 2024-05-24 NOTE — UTILIZATION REVIEW
NOTIFICATION OF ADMISSION DISCHARGE   This is a Notification of Discharge from [facility]. Please be advised that this patient has been discharge from our facility. Below you will find the admission and discharge date and time including the patient’s disposition.   UTILIZATION REVIEW CONTACT:  Nevin Whitley  Utilization   Network Utilization Review Department  Phone: [phone] x carefully listen to the prompts. All voicemails are confidential.  Email: [email or web address]     ADMISSION INFORMATION  PRESENTATION DATE: 5/22/2024 12:12 AM  OBERVATION ADMISSION DATE:   INPATIENT ADMISSION DATE: 5/22/24 12:12 AM   DISCHARGE DATE: 5/23/2024  4:45 PM   DISPOSITION:Home/Self Care    Network Utilization Review Department  ATTENTION: Please call with any questions or concerns to [phone] and carefully listen to the prompts so that you are directed to the right person. All voicemails are confidential.   For Discharge needs, contact Care Management DC Support Team at [phone] opt. 2  Send all requests for admission clinical reviews, approved or denied determinations and any other requests to dedicated fax number below belonging to the campus where the patient is receiving treatment. List of dedicated fax numbers for the Facilities:  FACILITY NAME UR FAX NUMBER   ADMISSION DENIALS (Administrative/Medical Necessity) [phone]   DISCHARGE SUPPORT TEAM ([facility]) [phone]   PARENT CHILD HEALTH (Maternity/NICU/Pediatrics) [phone]   [facility] [phone]   [facility] [phone]   [facility] [phone]   [facility] [phone]   [facility] [phone]   [facility] [phone]   [facility] [phone]   [facility] [phone]   [facility]  [facility] [phone]   [facility] [phone]   [facility] [phone]   [facility] [phone]

## 2024-05-26 LAB
APTT SCREEN TO CONFIRM RATIO: 1.06 RATIO (ref 0–1.34)
CONFIRM APTT/NORMAL: 29.9 SEC (ref 0–47.6)
LA PPP-IMP: NORMAL
PROT S ACT/NOR PPP: 127 % (ref 61–136)
PROT S PPP-ACNC: 95 % (ref 60–150)
SCREEN APTT: 36.4 SEC (ref 0–43.5)
SCREEN DRVVT: 39.4 SEC (ref 0–47)
THROMBIN TIME: 16.1 SEC (ref 0–23)

## 2024-05-27 LAB
PROT C AG ACT/NOR PPP IA: 129 % OF NORMAL (ref 60–150)
PROT S ACT/NOR PPP: >150 % (ref 71–117)

## 2024-06-04 ENCOUNTER — OFFICE VISIT (OUTPATIENT)
Dept: FAMILY MEDICINE CLINIC | Facility: CLINIC | Age: 39
End: 2024-06-04
Payer: COMMERCIAL

## 2024-06-04 VITALS
OXYGEN SATURATION: 97 % | HEART RATE: 74 BPM | TEMPERATURE: 97.6 F | BODY MASS INDEX: 29.66 KG/M2 | WEIGHT: 189 LBS | DIASTOLIC BLOOD PRESSURE: 88 MMHG | HEIGHT: 67 IN | SYSTOLIC BLOOD PRESSURE: 132 MMHG

## 2024-06-04 DIAGNOSIS — I63.9 CEREBROVASCULAR ACCIDENT (CVA), UNSPECIFIED MECHANISM (HCC): ICD-10-CM

## 2024-06-04 DIAGNOSIS — E78.1 HYPERTRIGLYCERIDEMIA: ICD-10-CM

## 2024-06-04 DIAGNOSIS — Z76.89 ENCOUNTER FOR SUPPORT AND COORDINATION OF TRANSITION OF CARE: Primary | ICD-10-CM

## 2024-06-04 PROCEDURE — 99495 TRANSJ CARE MGMT MOD F2F 14D: CPT | Performed by: FAMILY MEDICINE

## 2024-06-04 RX ORDER — ONABOTULINUMTOXINA 200 [USP'U]/1
INJECTION, POWDER, LYOPHILIZED, FOR SOLUTION INTRADERMAL; INTRAMUSCULAR
COMMUNITY
Start: 2024-04-25

## 2024-06-04 RX ORDER — ICOSAPENT ETHYL 1 G/1
2 CAPSULE ORAL 2 TIMES DAILY
Qty: 180 CAPSULE | Refills: 5 | Status: SHIPPED | OUTPATIENT
Start: 2024-06-04

## 2024-06-04 NOTE — PROGRESS NOTES
Assessment/Plan:        Problem List Items Addressed This Visit          Cardiovascular and Mediastinum    Stroke (cerebrum) (HCC)     Other Visit Diagnoses       Encounter for support and coordination of transition of care    -  Primary             Subjective:     Patient ID: Phani Adkins is a 39 y.o. male.    Transition of care yoel Jeronimo hospitalized initially in the ER at [facility] transfer to [facility] ischemic stroke left hemisphere patient had TPN and did very well had recent and most of his functions within the first 24 hours patient had elevated lipids particularly triglycerides is on moderately intense atorvastatin is also on baby aspirin daily is in need of a Zio patch to complete the cardiac workup he did have an echo bubble test which apparently was normal patient also had CT brain with no abnormalities was unable to get an MRI because of an an shielded deep brain stimulation lead would like to return to work tomorrow but we have talked him into staying off until the 13th so he has a chance to get his diet in order and complete some of his additional testing        Review of Systems   Constitutional:  Positive for activity change.   Neurological:         Patient has regained all functions right now neurologically no sign of any weakness of right side of body and speech is back to normal         Objective:     Physical Exam  Constitutional:       General: He is not in acute distress.     Appearance: He is well-developed. He is not diaphoretic.   HENT:      Head: Normocephalic.      Right Ear: External ear normal.      Left Ear: External ear normal.      Nose: Nose normal.   Eyes:      General: No scleral icterus.        Right eye: No discharge.         Left eye: No discharge.      Conjunctiva/sclera: Conjunctivae normal.      Pupils: Pupils are equal, round, and reactive to light.   Neck:      Thyroid: No thyromegaly.      Trachea: No tracheal deviation.   Cardiovascular:      Rate and Rhythm: Normal rate  "and regular rhythm.      Heart sounds: Normal heart sounds. No murmur heard.     No friction rub. No gallop.   Pulmonary:      Effort: Pulmonary effort is normal. No respiratory distress.      Breath sounds: Normal breath sounds. No wheezing.   Abdominal:      General: Bowel sounds are normal.      Palpations: Abdomen is soft. There is no mass.      Tenderness: There is no abdominal tenderness. There is no guarding.   Musculoskeletal:         General: No deformity.      Cervical back: Normal range of motion.   Lymphadenopathy:      Cervical: No cervical adenopathy.   Skin:     General: Skin is warm and dry.      Findings: No erythema or rash.   Neurological:      Mental Status: He is alert and oriented to person, place, and time.      Cranial Nerves: No cranial nerve deficit.   Psychiatric:         Thought Content: Thought content normal.           Vitals:    06/04/24 1454   BP: 132/88   BP Location: Left arm   Patient Position: Sitting   Cuff Size: Standard   Pulse: 74   Temp: 97.6 °F (36.4 °C)   TempSrc: Temporal   SpO2: 97%   Weight: 85.7 kg (189 lb)   Height: 5' 7\" (1.702 m)       The following portions of the patient's history were reviewed and updated as appropriate: He  has a past medical history of Herpes zoster and Tinea corporis.  He   Patient Active Problem List    Diagnosis Date Noted    Stroke-like symptoms 05/22/2024    Alcohol abuse 05/22/2024    Stroke (cerebrum) (HCC) 05/21/2024    Seborrheic keratosis 02/28/2023    Tobacco user 08/31/2022    Cervical dystonia 06/23/2022    Generalized anxiety disorder 06/16/2017    Essential tremor 05/19/2016     He  has a past surgical history that includes Brain surgery; Knee surgery (Left); Insertion / placement / revision neurostimulator (01/2016); and Tooth extraction.  His family history includes Diabetes in his paternal grandmother; No Known Problems in his mother; Pancreatic cancer in his maternal grandmother.  He  reports that he has been smoking " cigarettes. He has never used smokeless tobacco. He reports current alcohol use of about 3.0 standard drinks of alcohol per week. He reports that he does not use drugs.  Current Outpatient Medications   Medication Sig Dispense Refill    aspirin (ECOTRIN LOW STRENGTH) 81 mg EC tablet Take 1 tablet (81 mg total) by mouth daily 30 tablet 0    atorvastatin (LIPITOR) 40 mg tablet Take 1 tablet (40 mg total) by mouth every evening 30 tablet 0    Botox 200 units SOLR       escitalopram (LEXAPRO) 5 mg tablet       primidone (MYSOLINE) 50 mg tablet Take 200 mg by mouth daily 4 tablets daily.      propranolol (INDERAL LA) 120 mg 24 hr capsule        No current facility-administered medications for this visit.     Current Outpatient Medications on File Prior to Visit   Medication Sig    aspirin (ECOTRIN LOW STRENGTH) 81 mg EC tablet Take 1 tablet (81 mg total) by mouth daily    atorvastatin (LIPITOR) 40 mg tablet Take 1 tablet (40 mg total) by mouth every evening    Botox 200 units SOLR     escitalopram (LEXAPRO) 5 mg tablet     primidone (MYSOLINE) 50 mg tablet Take 200 mg by mouth daily 4 tablets daily.    propranolol (INDERAL LA) 120 mg 24 hr capsule     [DISCONTINUED] Xeomin 100 units SOLR      No current facility-administered medications on file prior to visit.     He has No Known Allergies..    Transitional Care Management Review:  Phani Adkins is a 39 y.o. male here for TCM follow up.     During the TCM phone call patient stated:    TCM Call       Date and time call was made  5/24/2024  7:42 AM    Hospital care reviewed  Records reviewed    Patient was hospitialized at  [facility]    Date of Admission  05/22/24    Date of discharge  05/23/24    Diagnosis  stroke like sxs    Disposition  Home  self care    Were the patients medications reviewed and updated  No    Current Symptoms  --  started with runny nose 5/29/24          TCM Call       Post hospital issues  None    Should patient be enrolled in anticoag  monitoring?  No    Scheduled for follow up?  Yes  APPT 6/4/24 AT 3 PM    Patients specialists  Neurologist    Did you obtain your prescribed medications  Yes    Do you need help managing your prescriptions or medications  No    Is transportation to your appointment needed  No    I have advised the patient to call PCP with any new or worsening symptoms  Nicki Kaur, Practice Coordinator                Dipesh Carvajal, DO

## 2024-06-04 NOTE — LETTER
To whom it may concern:    Mr. Phani Adkins is an established patient of our medical practice.  He was recently hospitalized at the [facility] for  an ischemic stroke and is recovering from his illness.  He will be released for his usual duties as of the evening of June 13, 2024 with no restrictions.    When he returns to work he will need to follow a very stringent low-fat low-cholesterol anti-inflammatory diet which will require him to carry his own food into work in [employer]upper[employer] containers.  His full recovery from his stroke is highly dependent upon him changing his diet and will require a lifetime of improved nutrition.  I respectfully request that you allow him to bring his own food to his place of employment to allow him to fully recover from his stroke and enjoy better health.    Respectfully,    Dipesh Carvajal, DO

## 2024-08-06 ENCOUNTER — TELEPHONE (OUTPATIENT)
Dept: FAMILY MEDICINE CLINIC | Facility: CLINIC | Age: 39
End: 2024-08-06

## 2024-08-15 ENCOUNTER — HOSPITAL ENCOUNTER (EMERGENCY)
Facility: HOSPITAL | Age: 39
Discharge: HOME | End: 2024-08-15
Attending: EMERGENCY MEDICINE
Payer: COMMERCIAL

## 2024-08-15 VITALS
OXYGEN SATURATION: 96 % | RESPIRATION RATE: 18 BRPM | SYSTOLIC BLOOD PRESSURE: 133 MMHG | TEMPERATURE: 97.5 F | HEART RATE: 70 BPM | BODY MASS INDEX: 29.76 KG/M2 | HEIGHT: 67 IN | WEIGHT: 189.6 LBS | DIASTOLIC BLOOD PRESSURE: 78 MMHG

## 2024-08-15 DIAGNOSIS — R25.1 TREMOR: Primary | ICD-10-CM
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
## 2024-08-15 LAB
ALBUMIN SERPL BCG-MCNC: 4.3 G/DL (ref 3.5–5)
ALP SERPL-CCNC: 76 U/L (ref 34–104)
ALT SERPL W P-5'-P-CCNC: 24 U/L (ref 7–52)
ANION GAP SERPL CALCULATED.3IONS-SCNC: 12 MMOL/L (ref 4–13)
AST SERPL W P-5'-P-CCNC: 16 U/L (ref 13–39)
BILIRUB SERPL-MCNC: 0.55 MG/DL (ref 0.2–1)
BUN SERPL-MCNC: 9 MG/DL (ref 5–25)
CALCIUM SERPL-MCNC: 9.3 MG/DL (ref 8.4–10.2)
CHLORIDE SERPL-SCNC: 103 MMOL/L (ref 96–108)
CO2 SERPL-SCNC: 22 MMOL/L (ref 21–32)
CREAT SERPL-MCNC: 0.78 MG/DL (ref 0.6–1.3)
GFR SERPL CREATININE-BSD FRML MDRD: 112 ML/MIN/1.73SQ M
GLUCOSE SERPL-MCNC: 101 MG/DL (ref 65–140)
MAGNESIUM SERPL-MCNC: 1.9 MG/DL (ref 1.9–2.7)
POTASSIUM SERPL-SCNC: 3.6 MMOL/L (ref 3.5–5.3)
PROT SERPL-MCNC: 6.5 G/DL (ref 6.4–8.4)
SODIUM SERPL-SCNC: 137 MMOL/L (ref 135–147)

## 2024-08-15 PROCEDURE — 36415 COLL VENOUS BLD VENIPUNCTURE: CPT | Performed by: EMERGENCY MEDICINE

## 2024-08-15 PROCEDURE — 83735 ASSAY OF MAGNESIUM: CPT | Performed by: EMERGENCY MEDICINE

## 2024-08-15 PROCEDURE — 80053 COMPREHEN METABOLIC PANEL: CPT | Performed by: EMERGENCY MEDICINE

## 2024-08-15 PROCEDURE — 99284 EMERGENCY DEPT VISIT MOD MDM: CPT

## 2024-08-15 PROCEDURE — 99284 EMERGENCY DEPT VISIT MOD MDM: CPT | Performed by: EMERGENCY MEDICINE

## 2024-08-15 RX ORDER — CLONAZEPAM 0.5 MG/1
0.25 TABLET ORAL ONCE
Status: COMPLETED | OUTPATIENT
Start: 2024-08-15 | End: 2024-08-15

## 2024-08-15 RX ADMIN — CLONAZEPAM 0.25 MG: 0.5 TABLET ORAL at 22:12
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
## 2024-10-10 ENCOUNTER — TELEPHONE (OUTPATIENT)
Dept: FAMILY MEDICINE CLINIC | Facility: CLINIC | Age: 39
End: 2024-10-10